# Patient Record
Sex: FEMALE | Race: WHITE | NOT HISPANIC OR LATINO | Employment: FULL TIME
[De-identification: names, ages, dates, MRNs, and addresses within clinical notes are randomized per-mention and may not be internally consistent; named-entity substitution may affect disease eponyms.]

---

## 2017-08-05 ENCOUNTER — HEALTH MAINTENANCE LETTER (OUTPATIENT)
Age: 52
End: 2017-08-05

## 2017-08-09 ENCOUNTER — OFFICE VISIT (OUTPATIENT)
Dept: DERMATOLOGY | Facility: CLINIC | Age: 52
End: 2017-08-09

## 2017-08-09 DIAGNOSIS — L40.0 PSORIASIS VULGARIS: ICD-10-CM

## 2017-08-09 DIAGNOSIS — L40.50 PSORIATIC ARTHRITIS (H): Primary | ICD-10-CM

## 2017-08-09 RX ORDER — TRIAMCINOLONE ACETONIDE 1 MG/G
OINTMENT TOPICAL
Qty: 454 G | Refills: 3 | Status: SHIPPED | OUTPATIENT
Start: 2017-08-09 | End: 2019-02-12

## 2017-08-09 RX ORDER — HYDROCORTISONE 25 MG/G
OINTMENT TOPICAL
Qty: 60 G | Refills: 3 | Status: SHIPPED | OUTPATIENT
Start: 2017-08-09 | End: 2023-10-31

## 2017-08-09 RX ORDER — ALPRAZOLAM 1 MG
TABLET ORAL
COMMUNITY
Start: 2015-01-01 | End: 2019-02-18

## 2017-08-09 RX ORDER — VENLAFAXINE 100 MG/1
TABLET ORAL
COMMUNITY
Start: 2016-10-16 | End: 2017-09-20

## 2017-08-09 RX ORDER — FAMOTIDINE 20 MG
TABLET ORAL
COMMUNITY
Start: 2016-10-16 | End: 2023-11-06

## 2017-08-09 ASSESSMENT — PAIN SCALES - GENERAL: PAINLEVEL: NO PAIN (0)

## 2017-08-09 NOTE — LETTER
"8/9/2017       RE: Gricel Agarwal  [address] [place] [id number]     Dear Colleague,    Thank you for referring your patient, Gricel Agarwal, to the [facility] DERMATOLOGY at [facility]. Please see a copy of my visit note below.    [facility] Dermatology Note      Dermatology Problem List:  1.Psoriasis and probable psoriatic arthritis    CC:   Chief Complaint   Patient presents with     Psoriasis     Gricel is here today for psoriasis. Gricel notes\" My psoriasis flares all the time. but right now its good\"         Encounter Date: Aug 9, 2017    History of Present Illness:  Ms. Gricel Agarwal is a 51 year old female who presents in self referral for psoriasis.  She remembers having flaking in the scalp but was diagnosed with psoriasis 16 years ago.  She did a trial of enbrel in the past but it didn't help.  She otherwise has used topical steroids in the past.  In 2015, she was diagnosed with breast cancer and underwent radiation therapy. She began to flare with skin and now joint disease.  She made the appointment 3 months ago and has had improvement with natural sunlight.  For her scalp, she is using Neutragena Tsal and Tgel shampoos which help.  She is very concerned about her joints.  She has had swelling in her hands and fingers and morning stiffness and pain of her hands, ankles (l>R), knees and lower back.    Past Medical History:   Patient has history of breast cancer s/p radiation.    Social History:  No smoking    Family History:  Negative for psoriasis    Medications:  Current Outpatient Prescriptions   Medication Sig Dispense Refill     fluticasone-salmeterol (ADVAIR DISKUS) 250-50 MCG/DOSE diskus inhaler        ALPRAZolam (XANAX) 1 MG tablet        anastrozole (ARIMIDEX)        venlafaxine (EFFEXOR) 100 MG tablet        Vitamin D, Cholecalciferol, 1000 UNITS CAPS        Albuterol Sulfate (VENTOLIN HFA IN)        triamcinolone (KENALOG) 0.1 % " ointment Use at night to rash on body 454 g 3     hydrocortisone 2.5 % ointment Use daily to rash on face, ears, underarms, or groin 60 g 3     No Known Allergies      Review of Systems:  -As per HPI  -Constitutional: The patient denies fatigue, fevers, chills, unintended weight loss, and night sweats.  -HEENT: Patient denies nonhealing oral sores.  -Skin: As above in HPI. No additional skin concerns.    Physical exam:  Vitals: There were no vitals taken for this visit.  GEN: This is a well developed, well-nourished female in no acute distress, in a pleasant mood.    SKIN: Sun-exposed skin, which includes the head/face, neck, both arms, digits, and/or nails was examined.   -There is an erythematous well demarcated plaque with silvery micaceous scale on the elbows, knees and legs.  Guttate papule of trunk and extremities.  Pitting and onycholysis of fingernails.  Boggy swollen DIP, PIP, and MCP joints of bilateral fingers.  Few crusts of anterior scalp. Mild scale of ears .    -No other lesions of concern on areas examined.     Impression/Plan:  1. Psoriasis and probable Psoriatic arthritis    Discussed cardiac risk factors and psoriatic arthritis- referral to rheumatology. May be complicated with recent breast cancer history    Continue natural sunlight- doing well.  May need to consider NBUVB in fall    Triamcinolone 0.1% ointment to body daily    Hydrocortisone 2.5% ointment to face daily        Follow-up in 3 months, earlier for new or changing lesions.       Staff Involved:  Staff Only    Again, thank you for allowing me to participate in the care of your patient.      Sincerely,    Nicolle Raphael MD

## 2017-08-09 NOTE — NURSING NOTE
"Dermatology Rooming Note    Gricel Agarwal's goals for this visit include:   Chief Complaint   Patient presents with     Psoriasis     Gricel is here today for psoriasis. Gricel notes\" My psoriasis flares all the time. but right now its good\"     Korina Alas MA    "

## 2017-08-09 NOTE — LETTER
Date:August 14, 2017      Patient was self referred, no letter generated. Do not send.        [facility] Health Information

## 2017-08-09 NOTE — PROGRESS NOTES
"[facility] Dermatology Note      Dermatology Problem List:  1.Psoriasis and probable psoriatic arthritis    CC:   Chief Complaint   Patient presents with     Psoriasis     Gricel is here today for psoriasis. Gricel notes\" My psoriasis flares all the time. but right now its good\"         Encounter Date: Aug 9, 2017    History of Present Illness:  Ms. Gricel Agarwal is a 51 year old female who presents in self referral for psoriasis.  She remembers having flaking in the scalp but was diagnosed with psoriasis 16 years ago.  She did a trial of enbrel in the past but it didn't help.  She otherwise has used topical steroids in the past.  In 2015, she was diagnosed with breast cancer and underwent radiation therapy. She began to flare with skin and now joint disease.  She made the appointment 3 months ago and has had improvement with natural sunlight.  For her scalp, she is using Neutragena Tsal and Tgel shampoos which help.  She is very concerned about her joints.  She has had swelling in her hands and fingers and morning stiffness and pain of her hands, ankles (l>R), knees and lower back.    Past Medical History:   Patient has history of breast cancer s/p radiation.    Social History:  No smoking    Family History:  Negative for psoriasis    Medications:  Current Outpatient Prescriptions   Medication Sig Dispense Refill     fluticasone-salmeterol (ADVAIR DISKUS) 250-50 MCG/DOSE diskus inhaler        ALPRAZolam (XANAX) 1 MG tablet        anastrozole (ARIMIDEX)        venlafaxine (EFFEXOR) 100 MG tablet        Vitamin D, Cholecalciferol, 1000 UNITS CAPS        Albuterol Sulfate (VENTOLIN HFA IN)        triamcinolone (KENALOG) 0.1 % ointment Use at night to rash on body 454 g 3     hydrocortisone 2.5 % ointment Use daily to rash on face, ears, underarms, or groin 60 g 3     No Known Allergies      Review of Systems:  -As per HPI  -Constitutional: The patient denies fatigue, fevers, chills, unintended weight " loss, and night sweats.  -HEENT: Patient denies nonhealing oral sores.  -Skin: As above in HPI. No additional skin concerns.    Physical exam:  Vitals: There were no vitals taken for this visit.  GEN: This is a well developed, well-nourished female in no acute distress, in a pleasant mood.    SKIN: Sun-exposed skin, which includes the head/face, neck, both arms, digits, and/or nails was examined.   -There is an erythematous well demarcated plaque with silvery micaceous scale on the elbows, knees and legs.  Guttate papule of trunk and extremities.  Pitting and onycholysis of fingernails.  Boggy swollen DIP, PIP, and MCP joints of bilateral fingers.  Few crusts of anterior scalp. Mild scale of ears .    -No other lesions of concern on areas examined.     Impression/Plan:  1. Psoriasis and probable Psoriatic arthritis    Discussed cardiac risk factors and psoriatic arthritis- referral to rheumatology. May be complicated with recent breast cancer history    Continue natural sunlight- doing well.  May need to consider NBUVB in fall    Triamcinolone 0.1% ointment to body daily    Hydrocortisone 2.5% ointment to face daily        Follow-up in 3 months, earlier for new or changing lesions.       Staff Involved:  Staff Only

## 2017-08-09 NOTE — MR AVS SNAPSHOT
After Visit Summary   8/9/2017    Gricel Agarwal    MRN: [id number]           Patient Information     Date Of Birth          1965        Visit Information        Provider Department      8/9/2017 8:00 AM Nicolle Raphael MD M [facility] Dermatology        Today's Diagnoses     Psoriatic arthritis (H)    -  1    Psoriasis vulgaris           Follow-ups after your visit        Additional Services     RHEUMATOLOGY REFERRAL       Your provider has referred you to: Rheumatology    Please be aware that coverage of these services is subject to the terms and limitations of your health insurance plan.  Call member services at your health plan with any benefit or coverage questions.      Please bring the following with you to your appointment:    (1) Any X-Rays, CTs or MRIs which have been performed.  Contact the facility where they were done to arrange for  prior to your scheduled appointment.    (2) List of current medications   (3) This referral request   (4) Any documents/labs given to you for this referral                  Your next 10 appointments already scheduled     Nov 08, 2017  8:30 AM CST   (Arrive by 8:15 AM)   Return Visit with Nicolle Raphael MD   [facility] Dermatology ([facility])    [address] [place] [id number] [phone]              Who to contact     Please call your clinic at [phone] to:    Ask questions about your health    Make or cancel appointments    Discuss your medicines    Learn about your test results    Speak to your doctor   If you have compliments or concerns about an experience at your clinic, or if you wish to file a complaint, please contact [facility] Patient Relations at [phone] or email us at [email or web address]         Additional Information About Your Visit        MyChart Information     [employer] gives you secure access to your electronic health  record. If you see a primary care provider, you can also send messages to your care team and make appointments. If you have questions, please call your primary care clinic.  If you do not have a primary care provider, please call [phone] and they will assist you.      [employer] is an electronic gateway that provides easy, online access to your medical records. With [employer], you can request a clinic appointment, read your test results, renew a prescription or communicate with your care team.     To access your existing account, please contact your [facility] or call [phone] for assistance.        Care EveryWhere ID     This is your Care EveryWhere ID. This could be used by other organizations to access your [facility] medical records  [id number]         Blood Pressure from Last 3 Encounters:   No data found for BP    Weight from Last 3 Encounters:   No data found for Wt              We Performed the Following     RHEUMATOLOGY REFERRAL          Today's Medication Changes          These changes are accurate as of: 8/9/17  8:27 AM.  If you have any questions, ask your nurse or doctor.               Start taking these medicines.        Dose/Directions    hydrocortisone 2.5 % ointment   Used for:  Psoriasis vulgaris   Started by:  Nicolle Raphael MD        Use daily to rash on face, ears, underarms, or groin   Quantity:  60 g   Refills:  3       triamcinolone 0.1 % ointment   Commonly known as:  KENALOG   Used for:  Psoriasis vulgaris   Started by:  Nicolle Raphael MD        Use at night to rash on body   Quantity:  454 g   Refills:  3            Where to get your medicines      These medications were sent to [facility]/pharmacy #[id number] [address] AT [address] [address]     Phone:  [phone]     hydrocortisone 2.5 % ointment    triamcinolone 0.1 % ointment                Primary Care Provider    None  Specified       No primary provider on file.        Equal Access to Services     BAY GIORDANO : Hadii aad ku hadreidcrystal Neumann, wanikitada robertokarenha, sharonmarc salehiainmedhat fernández. So [facility] [phone].    ATENCIÓN: Si habla español, tiene a luis disposición servicios gratuitos de asistencia lingüística. Llame al [phone].    We comply with applicable federal civil rights laws and [place] laws. We do not discriminate on the basis of race, color, national origin, age, disability sex, sexual orientation or gender identity.            Thank you!     Thank you for choosing [facility] DERMATOLOGY  for your care. Our goal is always to provide you with excellent care. Hearing back from our patients is one way we can continue to improve our services. Please take a few minutes to complete the written survey that you may receive in the mail after your visit with us. Thank you!             Your Updated Medication List - Protect others around you: Learn how to safely use, store and throw away your medicines at www.disposemymeds.org.          This list is accurate as of: 8/9/17  8:27 AM.  Always use your most recent med list.                   Brand Name Dispense Instructions for use Diagnosis    ADVAIR DISKUS 250-50 MCG/DOSE diskus inhaler   Generic drug:  fluticasone-salmeterol           ALPRAZolam 1 MG tablet    XANAX          anastrozole    ARIMIDEX          hydrocortisone 2.5 % ointment     60 g    Use daily to rash on face, ears, underarms, or groin    Psoriasis vulgaris       triamcinolone 0.1 % ointment    KENALOG    454 g    Use at night to rash on body    Psoriasis vulgaris       venlafaxine 100 MG tablet    EFFEXOR          VENTOLIN HFA IN           Vitamin D (Cholecalciferol) 1000 UNITS Caps

## 2017-08-24 ENCOUNTER — PRE VISIT (OUTPATIENT)
Dept: RHEUMATOLOGY | Facility: CLINIC | Age: 52
End: 2017-08-24

## 2017-08-24 NOTE — TELEPHONE ENCOUNTER
1.  Date/reason for appt: 9/20/17- Psoriatic Arthritis  2.  Referring provider: Dr. Raphael  3.  Call to patient (Yes / No - short description): no, internal referral  4.  Previous care at / records requested from: [facility] -- records in epic

## 2017-09-20 ENCOUNTER — OFFICE VISIT (OUTPATIENT)
Dept: RHEUMATOLOGY | Facility: CLINIC | Age: 52
End: 2017-09-20
Attending: NURSE PRACTITIONER
Payer: COMMERCIAL

## 2017-09-20 VITALS
HEIGHT: 69 IN | BODY MASS INDEX: 28.82 KG/M2 | OXYGEN SATURATION: 99 % | DIASTOLIC BLOOD PRESSURE: 88 MMHG | HEART RATE: 76 BPM | WEIGHT: 194.6 LBS | SYSTOLIC BLOOD PRESSURE: 130 MMHG

## 2017-09-20 DIAGNOSIS — M19.90 INFLAMMATORY ARTHRITIS: ICD-10-CM

## 2017-09-20 DIAGNOSIS — L40.50 PSORIATIC ARTHRITIS (H): ICD-10-CM

## 2017-09-20 DIAGNOSIS — M19.90 INFLAMMATORY ARTHRITIS: Primary | ICD-10-CM

## 2017-09-20 LAB
ALBUMIN SERPL-MCNC: 3.8 G/DL (ref 3.4–5)
ALBUMIN UR-MCNC: NEGATIVE MG/DL
ALT SERPL W P-5'-P-CCNC: 34 U/L (ref 0–50)
APPEARANCE UR: ABNORMAL
AST SERPL W P-5'-P-CCNC: 28 U/L (ref 0–45)
BASOPHILS # BLD AUTO: 0 10E9/L (ref 0–0.2)
BASOPHILS NFR BLD AUTO: 0 %
BILIRUB UR QL STRIP: NEGATIVE
COLOR UR AUTO: YELLOW
CREAT SERPL-MCNC: 0.6 MG/DL (ref 0.52–1.04)
CRP SERPL-MCNC: <2.9 MG/L (ref 0–8)
DIFFERENTIAL METHOD BLD: NORMAL
EOSINOPHIL # BLD AUTO: 0 10E9/L (ref 0–0.7)
EOSINOPHIL NFR BLD AUTO: 0 %
ERYTHROCYTE [DISTWIDTH] IN BLOOD BY AUTOMATED COUNT: 12.8 % (ref 10–15)
ERYTHROCYTE [SEDIMENTATION RATE] IN BLOOD BY WESTERGREN METHOD: 6 MM/H (ref 0–30)
GFR SERPL CREATININE-BSD FRML MDRD: >90 ML/MIN/1.7M2
GLUCOSE UR STRIP-MCNC: NEGATIVE MG/DL
HBV CORE AB SERPL QL IA: NONREACTIVE
HBV SURFACE AG SERPL QL IA: NONREACTIVE
HCT VFR BLD AUTO: 39.4 % (ref 35–47)
HCV AB SERPL QL IA: NONREACTIVE
HGB BLD-MCNC: 13.3 G/DL (ref 11.7–15.7)
HGB UR QL STRIP: NEGATIVE
HIV 1+2 AB+HIV1 P24 AG SERPL QL IA: NONREACTIVE
IMM GRANULOCYTES # BLD: 0 10E9/L (ref 0–0.4)
IMM GRANULOCYTES NFR BLD: 0.2 %
KETONES UR STRIP-MCNC: NEGATIVE MG/DL
LEUKOCYTE ESTERASE UR QL STRIP: ABNORMAL
LYMPHOCYTES # BLD AUTO: 0.9 10E9/L (ref 0.8–5.3)
LYMPHOCYTES NFR BLD AUTO: 17.6 %
MCH RBC QN AUTO: 32.3 PG (ref 26.5–33)
MCHC RBC AUTO-ENTMCNC: 33.8 G/DL (ref 31.5–36.5)
MCV RBC AUTO: 96 FL (ref 78–100)
MONOCYTES # BLD AUTO: 0.4 10E9/L (ref 0–1.3)
MONOCYTES NFR BLD AUTO: 7.6 %
MUCOUS THREADS #/AREA URNS LPF: PRESENT /LPF
NEUTROPHILS # BLD AUTO: 3.8 10E9/L (ref 1.6–8.3)
NEUTROPHILS NFR BLD AUTO: 74.6 %
NITRATE UR QL: NEGATIVE
NRBC # BLD AUTO: 0 10*3/UL
NRBC BLD AUTO-RTO: 0 /100
PH UR STRIP: 6 PH (ref 5–7)
PLATELET # BLD AUTO: 204 10E9/L (ref 150–450)
RBC # BLD AUTO: 4.12 10E12/L (ref 3.8–5.2)
RBC #/AREA URNS AUTO: 2 /HPF (ref 0–2)
SOURCE: ABNORMAL
SP GR UR STRIP: 1.02 (ref 1–1.03)
SQUAMOUS #/AREA URNS AUTO: 8 /HPF (ref 0–1)
TSH SERPL DL<=0.005 MIU/L-ACNC: 3.35 MU/L (ref 0.4–4)
UROBILINOGEN UR STRIP-MCNC: 0 MG/DL (ref 0–2)
WBC # BLD AUTO: 5.1 10E9/L (ref 4–11)
WBC #/AREA URNS AUTO: 5 /HPF (ref 0–2)

## 2017-09-20 PROCEDURE — 86140 C-REACTIVE PROTEIN: CPT | Performed by: NURSE PRACTITIONER

## 2017-09-20 PROCEDURE — 86200 CCP ANTIBODY: CPT | Performed by: NURSE PRACTITIONER

## 2017-09-20 PROCEDURE — 86480 TB TEST CELL IMMUN MEASURE: CPT | Performed by: NURSE PRACTITIONER

## 2017-09-20 PROCEDURE — 84450 TRANSFERASE (AST) (SGOT): CPT | Performed by: NURSE PRACTITIONER

## 2017-09-20 PROCEDURE — 99212 OFFICE O/P EST SF 10 MIN: CPT | Mod: ZF

## 2017-09-20 PROCEDURE — 82040 ASSAY OF SERUM ALBUMIN: CPT | Performed by: NURSE PRACTITIONER

## 2017-09-20 PROCEDURE — 82565 ASSAY OF CREATININE: CPT | Performed by: NURSE PRACTITIONER

## 2017-09-20 PROCEDURE — 84443 ASSAY THYROID STIM HORMONE: CPT | Performed by: NURSE PRACTITIONER

## 2017-09-20 PROCEDURE — 84460 ALANINE AMINO (ALT) (SGPT): CPT | Performed by: NURSE PRACTITIONER

## 2017-09-20 PROCEDURE — 36415 COLL VENOUS BLD VENIPUNCTURE: CPT | Performed by: NURSE PRACTITIONER

## 2017-09-20 PROCEDURE — 87389 HIV-1 AG W/HIV-1&-2 AB AG IA: CPT | Performed by: NURSE PRACTITIONER

## 2017-09-20 PROCEDURE — 86039 ANTINUCLEAR ANTIBODIES (ANA): CPT | Performed by: NURSE PRACTITIONER

## 2017-09-20 PROCEDURE — 81001 URINALYSIS AUTO W/SCOPE: CPT | Performed by: NURSE PRACTITIONER

## 2017-09-20 PROCEDURE — 85025 COMPLETE CBC W/AUTO DIFF WBC: CPT | Performed by: NURSE PRACTITIONER

## 2017-09-20 PROCEDURE — 87340 HEPATITIS B SURFACE AG IA: CPT | Performed by: NURSE PRACTITIONER

## 2017-09-20 PROCEDURE — 86803 HEPATITIS C AB TEST: CPT | Performed by: NURSE PRACTITIONER

## 2017-09-20 PROCEDURE — 86431 RHEUMATOID FACTOR QUANT: CPT | Performed by: NURSE PRACTITIONER

## 2017-09-20 PROCEDURE — 85652 RBC SED RATE AUTOMATED: CPT | Performed by: NURSE PRACTITIONER

## 2017-09-20 PROCEDURE — 86038 ANTINUCLEAR ANTIBODIES: CPT | Performed by: NURSE PRACTITIONER

## 2017-09-20 PROCEDURE — 86704 HEP B CORE ANTIBODY TOTAL: CPT | Performed by: NURSE PRACTITIONER

## 2017-09-20 RX ORDER — VENLAFAXINE HYDROCHLORIDE 150 MG/1
300 TABLET, EXTENDED RELEASE ORAL DAILY
Qty: 1 TABLET | Refills: 0 | COMMUNITY
Start: 2017-09-20 | End: 2019-02-18

## 2017-09-20 ASSESSMENT — PAIN SCALES - GENERAL: PAINLEVEL: NO PAIN (0)

## 2017-09-20 NOTE — MR AVS SNAPSHOT
After Visit Summary   9/20/2017    Gricel Agarwal    MRN: [id number]           Patient Information     Date Of Birth          1965        Visit Information        Provider Department      9/20/2017 10:00 AM Lee Ross APRN CNP M [facility] Rheumatology        Today's Diagnoses     Inflammatory arthritis    -  1    Psoriatic arthritis (H)          Care Instructions    Recommend sulfasalazine or methotrexate           Follow-ups after your visit        Follow-up notes from your care team     Return in about 2 weeks (around 10/4/2017) for Labs + X-rays Today.      Your next 10 appointments already scheduled     Sep 20, 2017 11:30 AM CDT   Lab with  LAB   [facility] ([facility])    [address] [id number] [phone]            Sep 20, 2017 11:50 AM CDT   XR FOOT BILATERAL G/E 3 VIEWS with ORTHXR1   [facility] XRay ([facility])    [address] [address] [phone]           Please bring a list of your current medicines to your exam. (Include vitamins, minerals and over-thecounter medicines.) Leave your valuables at home.  Tell your doctor if there is a chance you may be pregnant.  You do not need to do anything special for this exam.            Sep 20, 2017 12:20 PM CDT   XR HAND BILATERAL 1 VIEW with UCXR1   [facility] ([facility])    [address] [id number] [phone]           Please bring a list of your current medicines to your exam. (Include vitamins, minerals and over-thecounter medicines.) Leave your valuables at home.  Tell your doctor if there is a chance you may be pregnant.  You do not need to do anything special for this exam.            Oct 09, 2017  7:30 AM CDT   (Arrive by 7:15 AM)   Return Visit with JENELLE Molina CNP [facility] Rheumatology (  [facility])    [address] [facility]  [address] [facility] [address]   [phone]            Nov 08, 2017  8:30 AM CST   (Arrive by 8:15 AM)   Return Visit with Nicolle Raphael MD   [facility] Dermatology ([facility])    [address] [facility]  [address] [facility] [address]   [phone]              Future tests that were ordered for you today     Open Future Orders        Priority Expected Expires Ordered    CBC with platelets differential Routine 9/20/2017 10/20/2017 9/20/2017    AST Routine 9/20/2017 10/20/2017 9/20/2017    ALT Routine 9/20/2017 10/20/2017 9/20/2017    Albumin level Routine 9/20/2017 10/20/2017 9/20/2017    Creatinine Routine 9/20/2017 10/20/2017 9/20/2017    CRP inflammation Routine 9/20/2017 10/20/2017 9/20/2017    Erythrocyte sedimentation rate auto Routine 9/20/2017 10/20/2017 9/20/2017    Hepatitis B core antibody Routine 9/20/2017 10/20/2017 9/20/2017    Hepatitis B surface antigen Routine 9/20/2017 10/20/2017 9/20/2017    Hepatitis C antibody Routine 9/20/2017 10/20/2017 9/20/2017    M Tuberculosis by Quantiferon Routine 9/20/2017 10/20/2017 9/20/2017    XR Foot Bilateral G/E 3 Views Routine 9/20/2017 10/20/2017 9/20/2017    SORIN by IFA: Laboratory Miscellaneous Order Routine 9/20/2017 10/20/2017 9/20/2017    Cyclic Citrullinated Peptide Antibody IgG Routine 9/20/2017 10/20/2017 9/20/2017    Rheumatoid factor Routine 9/20/2017 10/20/2017 9/20/2017    UA with Microscopic Routine 9/20/2017 10/20/2017 9/20/2017    HIV Antigen Antibody Combo Routine 9/20/2017 10/20/2017 9/20/2017    TSH with free T4 reflex Routine 9/20/2017 10/20/2017 9/20/2017    XR Hand Bilateral 1 View Routine 9/20/2017 9/20/2018 9/20/2017            Who to contact     If you have questions or need follow up information about today's clinic visit or your schedule please contact [facility] RHEUMATOLOGY directly at [phone].  Normal or non-critical lab and  "imaging results will be communicated to you by MyChart, letter or phone within 4 business days after the clinic has received the results. If you do not hear from us within 7 days, please contact the clinic through [employer] or phone. If you have a critical or abnormal lab result, we will notify you by phone as soon as possible.  Submit refill requests through [employer] or call your pharmacy and they will forward the refill request to us. Please allow 3 business days for your refill to be completed.          Additional Information About Your Visit        [employer] Information     [employer] gives you secure access to your electronic health record. If you see a primary care provider, you can also send messages to your care team and make appointments. If you have questions, please call your primary care clinic.  If you do not have a primary care provider, please call [phone] and they will assist you.        Care EveryWhere ID     This is your Care EveryWhere ID. This could be used by other organizations to access your [facility] medical records  [id number]        Your Vitals Were     Pulse Height Pulse Oximetry BMI (Body Mass Index)          76 1.753 m (5' 9\") 99% 28.74 kg/m2         Blood Pressure from Last 3 Encounters:   09/20/17 130/88    Weight from Last 3 Encounters:   09/20/17 88.3 kg (194 lb 9.6 oz)                 Today's Medication Changes          These changes are accurate as of: 9/20/17 11:28 AM.  If you have any questions, ask your nurse or doctor.               These medicines have changed or have updated prescriptions.        Dose/Directions    venlafaxine 150 MG Tb24 24 hr tablet   Commonly known as:  EFFEXOR-ER   This may have changed:  Another medication with the same name was removed. Continue taking this medication, and follow the directions you see here.   Used for:  Psoriatic arthritis (H), Inflammatory arthritis   Changed by:  Lee Ross APRN CNP        Dose:  300 mg   Take 2 tablets (300 " mg) by mouth daily   Quantity:  1 tablet   Refills:  0                Primary Care Provider    None Specified       No primary provider on file.        Equal Access to Services     BAY GIORDANO : Hadii aad ku hadreidcrystal Elviali, rainer robertokarenha, talia malone macklisa, medhat pauliein hayaaelia gironneo menjivar arya serra. So [facility] [phone].    ATENCIÓN: Si habla español, tiene a luis disposición servicios gratuitos de asistencia lingüística. Llame al [phone].    We comply with applicable federal civil rights laws and [place] laws. We do not discriminate on the basis of race, color, national origin, age, disability sex, sexual orientation or gender identity.            Thank you!     Thank you for choosing [facility] RHEUMATOLOGY  for your care. Our goal is always to provide you with excellent care. Hearing back from our patients is one way we can continue to improve our services. Please take a few minutes to complete the written survey that you may receive in the mail after your visit with us. Thank you!             Your Updated Medication List - Protect others around you: Learn how to safely use, store and throw away your medicines at www.disposemymeds.org.          This list is accurate as of: 9/20/17 11:28 AM.  Always use your most recent med list.                   Brand Name Dispense Instructions for use Diagnosis    ADVAIR DISKUS 250-50 MCG/DOSE diskus inhaler   Generic drug:  fluticasone-salmeterol           ALPRAZolam 1 MG tablet    XANAX          anastrozole    ARIMIDEX          hydrocortisone 2.5 % ointment     60 g    Use daily to rash on face, ears, underarms, or groin    Psoriasis vulgaris       triamcinolone 0.1 % ointment    KENALOG    454 g    Use at night to rash on body    Psoriasis vulgaris       venlafaxine 150 MG Tb24 24 hr tablet    EFFEXOR-ER    1 tablet    Take 2 tablets (300 mg) by mouth daily    Psoriatic arthritis (H), Inflammatory arthritis       VENTOLIN HFA IN           Vitamin D  (Cholecalciferol) 1000 UNITS Caps

## 2017-09-20 NOTE — PROGRESS NOTES
"[facility] - Rheumatology Clinic Visit        Gricel Agarwal  is a 51 year old here for consultation of possible psoriatic arthritis, +psoriasis under care of derm (young started, 1994). Enbrel 5 yrs ago 2012 so stopped not help with scalp psoriasis, prior topical steroids. Seen Rheum in 2013-no clear dx     Seen derm who looked at her hands and told her needs to see Rheum. Mornings with stiffness fingers, hard time making a fist, left outside hip, wrists this started 2 yr ago. Progressive the past few years \"getting up is stiff\" then better. This lasts 10 min then improved. C/o pain in always hands and fingers, dull ache ankles L>R, and low back with pain and swelling. Jang noroma in left foot. Stiff feet. +achilles pain intermittent, tendonitis in elbow, left out side hip (this is hard to sleep). Morning \"back is bad\" this has always been bad for me\"-cracks this to stretch it. Not sure if loss ROM. Does stretch. Better when gets up to move her back. Root canal and crown on 2 teeth then got ABX--needs another crown. ABX is resolved. Fingers swelling, always this been this way for a few years. Toes may swell. Migratory swelling. Left ankle laterally swelling. Fatigue     Psoriasis trunk extremities, elbows, knees, legs, scalp, inner ears, face \"plaques before not spots\". +nail changes. On Triamcinolone 0.1% ointment to body daily and Hydrocortisone 2.5% ointment to face daily.  Cream are working some. Skin worse with stress.     L breast CA invasive ductal carcinoma in situ gradwe 2 +invasive tumor ER/[place]+ there was concern DCIS with comedo type necrosis (not in lymph) did radiation in 2016 not needed chemo-[facility] Cancer Dr. Hsu this Nov is 2 years. On aimidex for 10 yr will be seeing oncologist soon     Denies any fever, chills, cough, SOB, CP.     PMH:  Medical-psoriasis, L breast cancer dx 2015 underwent radiation, root canal with crowns-got infection, irritable bowel, " "depression, asthma, DEXA  NL   Surgical-breast cancer surgery lumpectomy then lymph nodes removed 2 weeks later, 2013  No blood transfusions  Injury-left ankle ligaments torn (sports injury 16 yr old)    FH:  No autoimmune disorders, psoriasis, UC, crohn's, SLE, RA, PsA, gout.  No MS in family  Mother-healthy, HTN, thyroid    Father-passed colon cancer  Siblings-1 brother healthy  Children-1 dtr healthy  Father's side \"all  from cancer\" pancreatic PGM; leukemia -PGF      SH:  Rare Alcohol. Never Smoking. No IVDU. 1Children. Right handed. . Works at salon and cleans houses      PMSH personally reviewed and updated by me.    ROS:  Negative raynaud s phenomena, hairloss, sun sensitivities, keratoconjunctivitis sicca, pleurisy, significant rashes like malar, oral/nasal or ulcerations, inflammatory eye disease, inflammatory bowel disease. Negative for miscarriages over 2 months into pregnancy, blood clots, gout, UC, crohn's. No temporal headache, no jaw claudication, no scalp tenderness, vision changes, carotidynia, cough    +night sweats from her meds  +hot flashes   +see above   CONSTITUTIONAL: No fevers, night sweats or unintentional weight change. No acute distress, swollen glands  EYES: No vision change, diplopia, pain in eyes or red eyes   EARS, NOSE, MOUTH, THROAT: No tinnitus or hearing change, no epistaxis or nasal discharge, no oral lesions, throat clear. Normal saliva pool.  No thyroid enlargement  CARDIOVASCULAR: No chest pain, palpitations, or pain with walking, no orthopnea or PND   RESPIRATORY: No dyspnea, cough, shortness of breath or wheezing. No pleurisy.   GI: No nausea, vomiting, diarrhea or constipation, no abdominal pain, or blood in stools.   : No change in urine, no dysuria or hematuria   MUSCKL: See above    INTEGUMENTARY: No concerning lesions or moles   NEURO: No loss of strength or sensation, no numbness or tingling, no tremor, no dizziness, no headache. No falls " "  ENDO: No polyuria or polydipsia, no temperature intolerance   HEME/LYMPH:No concerning bumps, bleeding problems, or swollen lymph nodes. No recent infections, hospitalizations or new illnesses.   ALLERGY: No environmental allergies   PSYCH:No depression or anxiety, no sleep problems.  Otherwise 14 point ROS obtained, reviewed and found negative.     Physical exam:  Vitals: Blood pressure 130/88, pulse 76, height 1.753 m (5' 9\"), weight 88.3 kg (194 lb 9.6 oz), SpO2 99 %.  Wt Readings from Last 4 Encounters:   09/20/17 88.3 kg (194 lb 9.6 oz)     Constitutional: WD-WN-WG cooperative  Eyes: nl EOM, PERRLA, vision, conjunctiva, sclera  ENT: nl external ears, nose, hearing, lips, teeth, gums, throat  Neck: no mass or thyroid enlargement  Resp: lungs clear to auscultation, nl to palpation, nl effort  CV: RRR, no murmurs, rubs or gallops, no edema  GI: no ABD mass or tenderness, no HSM  : not tested  Lymph: no cervical or epitrochlear nodes  MS: S/t MCPs/PIPs, wrists with redness over her joints, dactylitis in her fingers, pitting and oncholysis of all fingernails. Styloid laxity. B/L elbow tendonitis with tender enthesitis and in her elbows. +bunions. +ITB and trochanteric bursa with enthesitis, +tender in achilles insertion site and plantar fasciitis. Left lateral ankle S/T. B/l bicept enthisitis. +SI tender. All other MJ, neck, shoulder, elbow, wrist, hand, spine, hip, knee, ankle, and foot joints were examined and otherwise found normal. Normal  strength. No deformity. Full ROM. Normal prayer sign. Negative Negative Lhermitte's sign. No periuncle erythema  Skin: no alopecia. +psoriasis elbows, knees, legs, trunk and extremities   Neuro: nl cranial nerves, strength, sensation, DTRs.   Psych: nl judgement, orientation, memory, affect.      Labs/Imaging:  No results found for this or any previous visit.    Impression/Plan:    1. Psoriatic arthritis  2. Psoriasis  3. Breast cancer, under care of oncology    She " has active psoriasis, tendonitis, synovitis, EAS , nail pitting. Meets the diagnosis of psoriatic arthritis. Active psoriatic arthritis, but will check rheumatoid arthritis labs and also x-rays to evaluate for erosive changes. Discussed options SSZ vs MTX. Avoid anti-TNF given her breast cancer hx. Will do labs and x-rays today (evaluate for erosive changes). I will also check her TSH given her fatigue. She will discuss these options and findings with her oncologist.      RTC 2 weeks to discuss results and options      The patient understood the rational for the diagnosis and treatment plan. Patient shared in the decision making. All questions were answered to best of my ability and the patient's satisfaction  Lee ALMANZAR, CNP, MSN  [facility]  Department of Rheumatology & Autoimmune Disorders

## 2017-09-20 NOTE — NURSING NOTE
"Chief Complaint   Patient presents with     Consult For     Referral for Psoriatic Arthritis.       Initial /88  Pulse 76  Ht 1.753 m (5' 9\")  Wt 88.3 kg (194 lb 9.6 oz)  SpO2 99%  BMI 28.74 kg/m2 Estimated body mass index is 28.74 kg/(m^2) as calculated from the following:    Height as of this encounter: 1.753 m (5' 9\").    Weight as of this encounter: 88.3 kg (194 lb 9.6 oz).  Medication Reconciliation: complete   Marysol Thomas., CMA    "

## 2017-09-20 NOTE — LETTER
"9/20/2017      RE: Gricel Agarwal  [address] [place] [address]  [facility] [id number]       [facility] - Rheumatology Clinic Visit        Gricel Agarwal  is a 51 year old here for consultation of possible psoriatic arthritis, +psoriasis under care of derm (young started, 1994). Enbrel 5 yrs ago 2012 so stopped not help with scalp psoriasis, prior topical steroids. Seen Rheum in 2013-no clear dx     Seen derm who looked at her hands and told her needs to see Rheum. Mornings with stiffness fingers, hard time making a fist, left outside hip, wrists this started 2 yr ago. Progressive the past few years \"getting up is stiff\" then better. This lasts 10 min then improved. C/o pain in always hands and fingers, dull ache ankles L>R, and low back with pain and swelling. Jang noroma in left foot. Stiff feet. +achilles pain intermittent, tendonitis in elbow, left out side hip (this is hard to sleep). Morning \"back is bad\" this has always been bad for me\"-cracks this to stretch it. Not sure if loss ROM. Does stretch. Better when gets up to move her back. Root canal and crown on 2 teeth then got ABX--needs another crown. ABX is resolved. Fingers swelling, always this been this way for a few years. Toes may swell. Migratory swelling. Left ankle laterally swelling. Fatigue     Psoriasis trunk extremities, elbows, knees, legs, scalp, inner ears, face \"plaques before not spots\". +nail changes. On Triamcinolone 0.1% ointment to body daily and Hydrocortisone 2.5% ointment to face daily.  Cream are working some. Skin worse with stress.     L breast CA invasive ductal carcinoma in situ gradwe 2 +invasive tumor ER/[place]+ there was concern DCIS with comedo type necrosis (not in lymph) did radiation in 2016 not needed chemo-[facility] Cancer Dr. Hsu this Nov is 2 years. On aimidex for 10 yr will be seeing oncologist soon     Denies any fever, chills, cough, SOB, CP.     PMH:  Medical-psoriasis, L breast cancer dx " "2015 underwent radiation, root canal with crowns-got infection, irritable bowel, depression, asthma, DEXA  NL   Surgical-breast cancer surgery lumpectomy then lymph nodes removed 2 weeks later, [facility]   No blood transfusions  Injury-left ankle ligaments torn (sports injury 16 yr old)    FH:  No autoimmune disorders, psoriasis, UC, crohn's, SLE, RA, PsA, gout.  No MS in family  Mother-healthy, HTN, thyroid    Father-passed colon cancer  Siblings-1 brother healthy  Children-1 dtr healthy  Father's side \"all  from cancer\" pancreatic PGM; leukemia -PGF      SH:  Rare Alcohol. Never Smoking. No IVDU. 1Children. Right handed. . Works at salon and cleans houses      PMSH personally reviewed and updated by me.    ROS:  Negative raynaud s phenomena, hairloss, sun sensitivities, keratoconjunctivitis sicca, pleurisy, significant rashes like malar, oral/nasal or ulcerations, inflammatory eye disease, inflammatory bowel disease. Negative for miscarriages over 2 months into pregnancy, blood clots, gout, UC, crohn's. No temporal headache, no jaw claudication, no scalp tenderness, vision changes, carotidynia, cough    +night sweats from her meds  +hot flashes   +see above   CONSTITUTIONAL: No fevers, night sweats or unintentional weight change. No acute distress, swollen glands  EYES: No vision change, diplopia, pain in eyes or red eyes   EARS, NOSE, MOUTH, THROAT: No tinnitus or hearing change, no epistaxis or nasal discharge, no oral lesions, throat clear. Normal saliva pool.  No thyroid enlargement  CARDIOVASCULAR: No chest pain, palpitations, or pain with walking, no orthopnea or PND   RESPIRATORY: No dyspnea, cough, shortness of breath or wheezing. No pleurisy.   GI: No nausea, vomiting, diarrhea or constipation, no abdominal pain, or blood in stools.   : No change in urine, no dysuria or hematuria   MUSCKL: See above    INTEGUMENTARY: No concerning lesions or moles   NEURO: No loss of strength or " "sensation, no numbness or tingling, no tremor, no dizziness, no headache. No falls   ENDO: No polyuria or polydipsia, no temperature intolerance   HEME/LYMPH:No concerning bumps, bleeding problems, or swollen lymph nodes. No recent infections, hospitalizations or new illnesses.   ALLERGY: No environmental allergies   PSYCH:No depression or anxiety, no sleep problems.  Otherwise 14 point ROS obtained, reviewed and found negative.     Physical exam:  Vitals: Blood pressure 130/88, pulse 76, height 1.753 m (5' 9\"), weight 88.3 kg (194 lb 9.6 oz), SpO2 99 %.  Wt Readings from Last 4 Encounters:   09/20/17 88.3 kg (194 lb 9.6 oz)     Constitutional: WD-WN-WG cooperative  Eyes: nl EOM, PERRLA, vision, conjunctiva, sclera  ENT: nl external ears, nose, hearing, lips, teeth, gums, throat  Neck: no mass or thyroid enlargement  Resp: lungs clear to auscultation, nl to palpation, nl effort  CV: RRR, no murmurs, rubs or gallops, no edema  GI: no ABD mass or tenderness, no HSM  : not tested  Lymph: no cervical or epitrochlear nodes  MS: S/t MCPs/PIPs, wrists with redness over her joints, dactylitis in her fingers, pitting and oncholysis of all fingernails. Styloid laxity. B/L elbow tendonitis with tender enthesitis and in her elbows. +bunions. +ITB and trochanteric bursa with enthesitis, +tender in achilles insertion site and plantar fasciitis. Left lateral ankle S/T. B/l bicept enthisitis. +SI tender. All other MJ, neck, shoulder, elbow, wrist, hand, spine, hip, knee, ankle, and foot joints were examined and otherwise found normal. Normal  strength. No deformity. Full ROM. Normal prayer sign. Negative Negative Lhermitte's sign. No periuncle erythema  Skin: no alopecia. +psoriasis elbows, knees, legs, trunk and extremities   Neuro: nl cranial nerves, strength, sensation, DTRs.   Psych: nl judgement, orientation, memory, affect.      Labs/Imaging:  No results found for this or any previous visit.    Impression/Plan:    1. " Psoriatic arthritis  2. Psoriasis  3. Breast cancer, under care of oncology    She has active psoriasis, tendonitis, synovitis, EAS , nail pitting. Meets the diagnosis of psoriatic arthritis. Active psoriatic arthritis, but will check rheumatoid arthritis labs and also x-rays to evaluate for erosive changes. Discussed options SSZ vs MTX. Avoid anti-TNF given her breast cancer hx. Will do labs and x-rays today (evaluate for erosive changes). I will also check her TSH given her fatigue. She will discuss these options and findings with her oncologist.      RTC 2 weeks to discuss results and options      The patient understood the rational for the diagnosis and treatment plan. Patient shared in the decision making. All questions were answered to best of my ability and the patient's satisfaction  Lee ALMANZAR, CNP, MSN  [facility]  Department of Rheumatology & Autoimmune Disorders

## 2017-09-21 LAB
CCP AB SER IA-ACNC: 1 U/ML
RHEUMATOID FACT SER NEPH-ACNC: <20 IU/ML (ref 0–20)

## 2017-09-22 LAB
ANA PAT SER IF-IMP: ABNORMAL
ANA SER QL IF: POSITIVE
ANA TITR SER IF: ABNORMAL {TITER}
M TB TUBERC IFN-G BLD QL: NEGATIVE
M TB TUBERC IFN-G/MITOGEN IGNF BLD: 0 IU/ML

## 2017-09-27 NOTE — PROGRESS NOTES
AN is non specific and could we seen in setting of malignancy, psoriasis.  -CCP and MTB QG -RF -hepatitis -HIV. NL TSH, CBC, liver and kidney tests. No erosions on x-rays

## 2017-10-04 ENCOUNTER — TRANSFERRED RECORDS (OUTPATIENT)
Dept: HEALTH INFORMATION MANAGEMENT | Facility: CLINIC | Age: 52
End: 2017-10-04

## 2017-11-08 ENCOUNTER — OFFICE VISIT (OUTPATIENT)
Dept: DERMATOLOGY | Facility: CLINIC | Age: 52
End: 2017-11-08

## 2017-11-08 DIAGNOSIS — L40.50 PSORIATIC ARTHRITIS (H): ICD-10-CM

## 2017-11-08 DIAGNOSIS — L40.9 PSORIASIS: Primary | ICD-10-CM

## 2017-11-08 RX ORDER — METHOTREXATE 2.5 MG/1
5 TABLET ORAL ONCE
Qty: 2 TABLET | Refills: 0 | Status: SHIPPED | OUTPATIENT
Start: 2017-11-08 | End: 2019-02-18 | Stop reason: SINTOL

## 2017-11-08 ASSESSMENT — PAIN SCALES - GENERAL: PAINLEVEL: NO PAIN (0)

## 2017-11-08 NOTE — LETTER
11/8/2017       RE: Gricel Agarwal  [facility] [facility] [address]     Dear Colleague,    Thank you for referring your patient, Gricel Agarwal, to the [facility]ERMATOLOGY at [facility]. Please see a copy of my visit note below.    Progress Notes   Nicolle Raphael MD (Physician)     Dermatology   Expand All Collapse All    [facility] Dermatology Note        Dermatology Problem List:  1.Psoriasis and psoriatic arthritis.  Evaluated by Rheum. Saw her Oncology MD who only felt comfortable with MTX due to breast cancer history.  Test dose methotrexate 5 mg once with labs in 1 week and then aim for methotrexate 15 mg weekly with labs     CC:        Chief Complaint   Patient presents with     Psoriasis                  Encounter Date:  Nov 8. 2017     History of Present Illness:  Ms. Gricel Agarwal is a 51 year old female who returns for psoriasis.  She remembers having flaking in the scalp but was diagnosed with psoriasis 16 years ago.  She did a trial of enbrel in the past but it didn't help.  She otherwise has used topical steroids in the past.  In 2015, she was diagnosed with breast cancer and underwent radiation therapy. She began to flare with skin and now joint disease.  She  was started on topical treatment last visit and referred to Rheum.  Rheum felt she has psoriatic arthritis but with her cancer history they wanted her to see oncology before starting any systemic medication.  She saw her oncology doctor and the medication the doctor felt comfortable with was methotrexate.  The patient has worsened with her skin disease since last visit and would like to start the methotrexate.     Past Medical History:   Patient has history of breast cancer s/p radiation.     Social History:  No smoking     Family History:  Negative for psoriasis                  No Known Allergies        Review of Systems:  -As per HPI    -Skin: As above in HPI. No  additional skin concerns.     Physical exam:  Vitals: See nursing note  GEN: This is a well developed, well-nourished female in no acute distress, in a pleasant mood.    SKIN: Sun-exposed skin, which includes the head/face, neck, both arms, digits, and/or nails was examined.   -There is an erythematous well demarcated plaque with silvery micaceous scale on the elbows, knees and legs.     -No other lesions of concern on areas examined.      Impression/Plan:  1. Psoriasis and Psoriatic arthritis    Baseline labs by Rheum for Methotrexate are normal and reviewed.  Discussed side effects of methotrexate including blood, liver, GI, renal, pregnancy and immunosuppression side effects.    Test dose methotrexate 5 mg once with repeat CBC and hepatic panel one week later then if tolerated increase to 15 mg once weekly with weekly safety labs    Triamcinolone 0.1% ointment to body daily    Hydrocortisone 2.5% ointment to face daily           Follow-up in 6 weeks, earlier for new or changing lesions.         Staff Involved:  Staff Only          Again, thank you for allowing me to participate in the care of your patient.      Sincerely,    Nicolle Raphael MD

## 2017-11-08 NOTE — PROGRESS NOTES
Progress Notes   Nicolle Raphael MD (Physician)     Dermatology   Expand All Collapse All    [facility] Dermatology Note        Dermatology Problem List:  1.Psoriasis and psoriatic arthritis.  Evaluated by Rheum. Saw her Oncology MD who only felt comfortable with MTX due to breast cancer history.  Test dose methotrexate 5 mg once with labs in 1 week and then aim for methotrexate 15 mg weekly with labs     CC:        Chief Complaint   Patient presents with     Psoriasis                  Encounter Date:  Nov 8. 2017     History of Present Illness:  Ms. Gricel Agarwal is a 51 year old female who returns for psoriasis.  She remembers having flaking in the scalp but was diagnosed with psoriasis 16 years ago.  She did a trial of enbrel in the past but it didn't help.  She otherwise has used topical steroids in the past.  In 2015, she was diagnosed with breast cancer and underwent radiation therapy. She began to flare with skin and now joint disease.  She was started on topical treatment last visit and referred to Rheum.  Rheum felt she has psoriatic arthritis but with her cancer history they wanted her to see oncology before starting any systemic medication.  She saw her oncology doctor and the medication the doctor felt comfortable with was methotrexate.  The patient has worsened with her skin disease since last visit and would like to start the methotrexate.     Past Medical History:   Patient has history of breast cancer s/p radiation.     Social History:  No smoking     Family History:  Negative for psoriasis                  No Known Allergies        Review of Systems:  -As per HPI    -Skin: As above in HPI. No additional skin concerns.     Physical exam:  Vitals: See nursing note  GEN: This is a well developed, well-nourished female in no acute distress, in a pleasant mood.    SKIN: Sun-exposed skin, which includes the head/face, neck, both arms, digits, and/or nails was examined.   -There  is an erythematous well demarcated plaque with silvery micaceous scale on the elbows, knees and legs.     -No other lesions of concern on areas examined.      Impression/Plan:  1. Psoriasis and Psoriatic arthritis    Baseline labs by Rheum for Methotrexate are normal and reviewed.  Discussed side effects of methotrexate including blood, liver, GI, renal, pregnancy and immunosuppression side effects.    Test dose methotrexate 5 mg once with repeat CBC and hepatic panel one week later then if tolerated increase to 15 mg once weekly with weekly safety labs    Triamcinolone 0.1% ointment to body daily    Hydrocortisone 2.5% ointment to face daily           Follow-up in 6 weeks, earlier for new or changing lesions.         Staff Involved:  Staff Only

## 2017-11-08 NOTE — NURSING NOTE
Dermatology Rooming Note    Gricel Agarwal's goals for this visit include:   Chief Complaint   Patient presents with     Derm Problem     Gricel is here for a psoriasis follow up.  States it's gotten worse since her last visit.            Toshia Swann LPN

## 2017-11-08 NOTE — MR AVS SNAPSHOT
After Visit Summary   11/8/2017    Gricel Agarwal    MRN: [id number]           Patient Information     Date Of Birth          1965        Visit Information        Provider Department      11/8/2017 8:30 AM Nicolle Raphael MD [facility]        Today's Diagnoses     Psoriasis    -  1    Psoriatic arthritis (H)           Follow-ups after your visit        Your next 10 appointments already scheduled     Nov 20, 2017  9:30 AM CST   LAB with  LAB   [facility] ([facility])    [address] [place] [id number] [phone]           Please do not eat 10-12 hours before your appointment if you are coming in fasting for labs on lipids, cholesterol, or glucose (sugar). This does not apply to pregnant women. Water, hot tea and black coffee (with nothing added) are okay. Do not drink other fluids, diet soda or chew gum.            Dec 18, 2017 10:00 AM CST   (Arrive by 9:45 AM)   Return Visit with Nicolle Raphael MD   [facility] ([facility])    [address] [place] [id number] [phone]              Future tests that were ordered for you today     Open Standing Orders        Priority Remaining Interval Expires Ordered    CBC with platelets differential Routine 5/5 weekly 12/28/2017 11/8/2017    Hepatic panel Routine 5/5 weekly 12/28/2017 11/8/2017            Who to contact     Please call your clinic at [phone] to:    Ask questions about your health    Make or cancel appointments    Discuss your medicines    Learn about your test results    Speak to your doctor   If you have compliments or concerns about an experience at your clinic, or if you wish to file a complaint, please contact [facility] Patient Relations at [phone] or email us at [email or web address]         Additional Information About Your Visit        Serenehart  Information     [employer] gives you secure access to your electronic health record. If you see a primary care provider, you can also send messages to your care team and make appointments. If you have questions, please call your primary care clinic.  If you do not have a primary care provider, please call [phone] and they will assist you.      [employer] is an electronic gateway that provides easy, online access to your medical records. With [employer], you can request a clinic appointment, read your test results, renew a prescription or communicate with your care team.     To access your existing account, please contact your [facility] or call [phone] for assistance.        Care EveryWhere ID     This is your Care EveryWhere ID. This could be used by other organizations to access your [facility] medical records  [id number]         Blood Pressure from Last 3 Encounters:   09/20/17 130/88    Weight from Last 3 Encounters:   09/20/17 88.3 kg (194 lb 9.6 oz)                 Today's Medication Changes          These changes are accurate as of: 11/8/17  9:01 AM.  If you have any questions, ask your nurse or doctor.               Start taking these medicines.        Dose/Directions    methotrexate sodium 2.5 MG Tabs   Used for:  Psoriasis, Psoriatic arthritis (H)   Started by:  Nicolle Raphael MD        Dose:  5 mg   Take 5 mg by mouth once for 1 dose   Quantity:  2 tablet   Refills:  0            Where to get your medicines      These medications were sent to [facility]/pharmacy [address] AT [address] [address]     Phone:  [phone]     methotrexate sodium 2.5 MG Tabs                Primary Care Provider    None Specified       No primary provider on file.        Equal Access to Services     BAY GIORDANO AH: rainer Naranjo, medhat rasmussen  tseringkrystinyumiko laresaaelia ah. So [facility] [phone].    ATENCIÓN: Si nadege bach, tiene a luis disposición servicios gratuitos de asistencia lingüística. Carly al [phone].    We comply with applicable federal civil rights laws and [place] laws. We do not discriminate on the basis of race, color, national origin, age, disability, sex, sexual orientation, or gender identity.            Thank you!     Thank you for choosing [facility] DER[facility]  for your care. Our goal is always to provide you with excellent care. Hearing back from our patients is one way we can continue to improve our services. Please take a few minutes to complete the written survey that you may receive in the mail after your visit with us. Thank you!             Your Updated Medication List - Protect others around you: Learn how to safely use, store and throw away your medicines at www.disposemymeds.org.          This list is accurate as of: 11/8/17  9:01 AM.  Always use your most recent med list.                   Brand Name Dispense Instructions for use Diagnosis    ADVAIR DISKUS 250-50 MCG/DOSE diskus inhaler   Generic drug:  fluticasone-salmeterol           ALPRAZolam 1 MG tablet    XANAX          anastrozole    ARIMIDEX          hydrocortisone 2.5 % ointment     60 g    Use daily to rash on face, ears, underarms, or groin    Psoriasis vulgaris       methotrexate sodium 2.5 MG Tabs     2 tablet    Take 5 mg by mouth once for 1 dose    Psoriasis, Psoriatic arthritis (H)       triamcinolone 0.1 % ointment    KENALOG    454 g    Use at night to rash on body    Psoriasis vulgaris       venlafaxine 150 MG Tb24 24 hr tablet    EFFEXOR-ER    1 tablet    Take 2 tablets (300 mg) by mouth daily    Psoriatic arthritis (H), Inflammatory arthritis       VENTOLIN HFA IN           Vitamin D (Cholecalciferol) 1000 UNITS Caps

## 2017-11-08 NOTE — LETTER
Date:November 9, 2017      Patient was self referred, no letter generated. Do not send.        [facility] Health Information

## 2017-11-20 DIAGNOSIS — L40.50 PSORIATIC ARTHRITIS (H): ICD-10-CM

## 2017-11-20 DIAGNOSIS — L40.9 PSORIASIS: ICD-10-CM

## 2017-11-20 LAB
ALBUMIN SERPL-MCNC: 3.3 G/DL (ref 3.4–5)
ALP SERPL-CCNC: 126 U/L (ref 40–150)
ALT SERPL W P-5'-P-CCNC: 33 U/L (ref 0–50)
AST SERPL W P-5'-P-CCNC: 21 U/L (ref 0–45)
BASOPHILS # BLD AUTO: 0 10E9/L (ref 0–0.2)
BASOPHILS NFR BLD AUTO: 0 %
BILIRUB DIRECT SERPL-MCNC: <0.1 MG/DL (ref 0–0.2)
BILIRUB SERPL-MCNC: 0.4 MG/DL (ref 0.2–1.3)
DIFFERENTIAL METHOD BLD: ABNORMAL
EOSINOPHIL # BLD AUTO: 0 10E9/L (ref 0–0.7)
EOSINOPHIL NFR BLD AUTO: 0 %
ERYTHROCYTE [DISTWIDTH] IN BLOOD BY AUTOMATED COUNT: 13.2 % (ref 10–15)
HCT VFR BLD AUTO: 39.7 % (ref 35–47)
HGB BLD-MCNC: 13.3 G/DL (ref 11.7–15.7)
IMM GRANULOCYTES # BLD: 0 10E9/L (ref 0–0.4)
IMM GRANULOCYTES NFR BLD: 0 %
LYMPHOCYTES # BLD AUTO: 1.2 10E9/L (ref 0.8–5.3)
LYMPHOCYTES NFR BLD AUTO: 32.6 %
MCH RBC QN AUTO: 32.6 PG (ref 26.5–33)
MCHC RBC AUTO-ENTMCNC: 33.5 G/DL (ref 31.5–36.5)
MCV RBC AUTO: 97 FL (ref 78–100)
MONOCYTES # BLD AUTO: 0.3 10E9/L (ref 0–1.3)
MONOCYTES NFR BLD AUTO: 8.4 %
NEUTROPHILS # BLD AUTO: 2.2 10E9/L (ref 1.6–8.3)
NEUTROPHILS NFR BLD AUTO: 59 %
NRBC # BLD AUTO: 0 10*3/UL
NRBC BLD AUTO-RTO: 0 /100
PLATELET # BLD AUTO: 195 10E9/L (ref 150–450)
PROT SERPL-MCNC: 6.8 G/DL (ref 6.8–8.8)
RBC # BLD AUTO: 4.08 10E12/L (ref 3.8–5.2)
WBC # BLD AUTO: 3.7 10E9/L (ref 4–11)

## 2017-12-21 ENCOUNTER — TELEPHONE (OUTPATIENT)
Dept: DERMATOLOGY | Facility: CLINIC | Age: 52
End: 2017-12-21

## 2017-12-21 NOTE — TELEPHONE ENCOUNTER
----- Message from Nicolle Raphael MD sent at 11/30/2017  8:37 AM CST -----  Toshia, please call the patient and make sure she gets an appt with Rheum so they can see her.  ----- Message -----     From: Lee Ross APRN CNP     Sent: 11/29/2017   4:15 PM       To: Nicolle Raphael MD    Yes, that would be best. She was to schedule.     Lee ALMANZAR, FABIAN, MSN  [facility]  Department of Rheumatology & Autoimmune Disorders  [facility]: [phone] Opt 2, then Opt 1 Scheduling   [facility]: [phone] Option 7 scheduling    ----- Message -----     From: Nicolle Raphael MD     Sent: 11/20/2017  10:51 AM       To: JENELLE Molina CNP    Hi, we share this patient.  Her psoriatic arthritis is becoming more painful.  I tried to start her methotrexate which was the only medication approved by her oncologist but her WBC dropped with just a test dose of methotrexate 5 mg.  Derm would not go on with methotrexate at that point.  Do you want to see her back to consider any other options?

## 2017-12-21 NOTE — TELEPHONE ENCOUNTER
Sent a my chart message on 12/6 and have tried calling patient 3 seperate times, and have left voicemails with Dr. Raphael's message and also left the Rheumatology appointment number in the message.

## 2018-01-03 NOTE — TELEPHONE ENCOUNTER
Left message with Terrance's scheduling information. Said I would send a letter in the mail too.

## 2019-01-29 ENCOUNTER — DOCUMENTATION ONLY (OUTPATIENT)
Dept: CARE COORDINATION | Facility: CLINIC | Age: 54
End: 2019-01-29

## 2019-02-12 ENCOUNTER — OFFICE VISIT (OUTPATIENT)
Dept: DERMATOLOGY | Facility: CLINIC | Age: 54
End: 2019-02-12
Payer: COMMERCIAL

## 2019-02-12 VITALS — DIASTOLIC BLOOD PRESSURE: 87 MMHG | HEART RATE: 71 BPM | SYSTOLIC BLOOD PRESSURE: 130 MMHG | OXYGEN SATURATION: 96 %

## 2019-02-12 DIAGNOSIS — L40.50 PSORIATIC ARTHRITIS (H): Primary | ICD-10-CM

## 2019-02-12 DIAGNOSIS — L40.0 PSORIASIS VULGARIS: ICD-10-CM

## 2019-02-12 RX ORDER — TRIAMCINOLONE ACETONIDE 1 MG/G
OINTMENT TOPICAL
Qty: 454 G | Refills: 3 | Status: SHIPPED | OUTPATIENT
Start: 2019-02-12 | End: 2023-10-31

## 2019-02-12 ASSESSMENT — PAIN SCALES - GENERAL: PAINLEVEL: MILD PAIN (3)

## 2019-02-12 NOTE — LETTER
Date:February 13, 2019      Patient was self referred, no letter generated. Do not send.        [facility] Health Information

## 2019-02-12 NOTE — PROGRESS NOTES
[facility] Dermatology Note      Dermatology Problem List:    1.Psoriasis and psoriatic arthritis.    --Evaluated by Rheum. Saw her Oncology MD who only felt comfortable with MTX due to breast cancer history. --Test dose methotrexate 5 mg once, but had a drop in WBC, so it was stopped  --Enbrel used back in 2012  --Will have the pt F/U with Lee Ross (rheumatology)    Encounter Date: Feb 12, 2019    CC:   Follow up for psoriasis    History of Present Illness:  Ms. Gricel Agarwal is a 53 year old female who presents as a follow-up for psoriasis and psoriatic arthritis. The patient was last seen September 2018 when she was given a test dose of MTX, but subsequently had leukopenia.    Today, the pt states this is the best her skin psoriasis has been in a while, although her joints are worse. The pt notes her skin psoriasis is currently active only on her lower legs and elbows. She did have scalp involvement in the past, but since starting an  shampoo (coal tar) from [employer], her scalp has cleared. The pt did use a tanning bed Qweek for a few weeks to get things under better control,but she has since stopped. The pt notes her worst joints are her left 1st and 2nd finger, sacrum, and left elbow, left shoulder, hip, and knee. She notes her fingers are sore in the morning, and it takes 10 minutes for them to become less stiff, but they are still stiff for a while longer. She has difficulty lifting her arm above her shoulder.    The pt continues to follow with her oncologist every 3 months. She was also recently diagnosed with osteopenia. Otherwise, the pt states she feels well, and she denies any other general or skin-specific complaints at this time.     Past Medical History:   There is no problem list on file for this patient.    No past medical history on file.  No past surgical history on file.    Social History:  Patient reports that  has never smoked. she has never used smokeless  tobacco.    Family History:  Family History   Problem Relation Age of Onset     Melanoma No family hx of      Skin Cancer No family hx of        Medications:  Current Outpatient Medications   Medication Sig Dispense Refill     Albuterol Sulfate (VENTOLIN HFA IN)        ALPRAZolam (XANAX) 1 MG tablet        anastrozole (ARIMIDEX)        fluticasone-salmeterol (ADVAIR DISKUS) 250-50 MCG/DOSE diskus inhaler        hydrocortisone 2.5 % ointment Use daily to rash on face, ears, underarms, or groin 60 g 3     methotrexate sodium 2.5 MG TABS Take 5 mg by mouth once for 1 dose 2 tablet 0     triamcinolone (KENALOG) 0.1 % ointment Use at night to rash on body 454 g 3     venlafaxine (EFFEXOR-ER) 150 MG TB24 24 hr tablet Take 2 tablets (300 mg) by mouth daily 1 tablet 0     Vitamin D, Cholecalciferol, 1000 UNITS CAPS           No Known Allergies      Review of Systems:  -As per HPI  -Constitutional: Otherwise feeling well today, in usual state of health.  -HEENT: Patient denies nonhealing oral sores.  -Skin: As above in HPI. No additional skin concerns.    Physical exam:  Vitals: There were no vitals taken for this visit.  GEN: This is a well developed, well-nourished female in no acute distress, in a pleasant mood.    SKIN: Focused examination of the face, scalp, neck, back, right and left arms, hands, finger, finger nails, legs, and feet was performed.  -Finger nails covered with nail polish. Onycholysis and oil spots of the toe nails  -Scattered pink papules and plaques with minimal scale on the bilateral lower legs and the elbows. No scaling of the scalp  -Dactylitis of the left first and second digit. None of the joints are painful on palpation  -No other lesions of concern on areas examined.     Impression/Plan:    1. Psoriasis and psoriatic arthritis    At this time, pt's cutaneous dz is at a stable place for the pt, however, her joint pain is active and start to limit her daily activities. Given hx of breast cancer,  we are limited to several medications. Unfortunately, the pt did not tolerate MTX. Apremilast is an option. Although the pt does have depression, it is currently well controlled. Hydroxyurea and sulfasalazine could also be options.    Due to ongoing and progressive joint pain and our inability to closely monitor progression, we have referred the pt back to rheumatology for evaluation, and ultimately, the decision on starting a systemic therapy for her psoriatic arthritis    I have refilled the pt's TAC today      CC Referred Self, MD  No address on file on close of this encounter.  Follow-up in 3 months, earlier for new or changing lesions.       Dr. Raphael staffed the patient.    Staff Involved:  Resident(Davida)/Staff  .I, Nicolle Raphael MD, saw this patient with the resident and agree with the resident s findings and plan of care as documented in the resident s note.

## 2019-02-12 NOTE — LETTER
2/12/2019       RE: Gricel Agarwal  [address] [place] [id number]     Dear Colleague,    Thank you for referring your patient, Gricel Agarwal, to the [facility] DERMATOLOG[facility] at [facility]. Please see a copy of my visit note below.    [facility] Dermatology Note      Dermatology Problem List:    1.Psoriasis and psoriatic arthritis.    --Evaluated by Rheum. Saw her Oncology MD who only felt comfortable with MTX due to breast cancer history. --Test dose methotrexate 5 mg once, but had a drop in WBC, so it was stopped  --Enbrel used back in 2012  --Will have the pt F/U with Lee Ross (rheumatology)    Encounter Date: Feb 12, 2019    CC:   Follow up for psoriasis    History of Present Illness:  Ms. Gricel Agarwal is a 53 year old female who presents as a follow-up for psoriasis and psoriatic arthritis. The patient was last seen September 2018 when she was given a test dose of MTX, but subsequently had leukopenia.    Today, the pt states this is the best her skin psoriasis has been in a while, although her joints are worse. The pt notes her skin psoriasis is currently active only on her lower legs and elbows. She did have scalp involvement in the past, but since starting an  shampoo (coal tar) from [employer], her scalp has cleared. The pt did use a tanning bed Qweek for a few weeks to get things under better control,but she has since stopped. The pt notes her worst joints are her left 1st and 2nd finger, sacrum, and left elbow, left shoulder, hip, and knee. She notes her fingers are sore in the morning, and it takes 10 minutes for them to become less stiff, but they are still stiff for a while longer. She has difficulty lifting her arm above her shoulder.    The pt continues to follow with her oncologist every 3 months. She was also recently diagnosed with osteopenia. Otherwise, the pt states she feels well, and she denies any other general or  skin-specific complaints at this time.     Past Medical History:   There is no problem list on file for this patient.    No past medical history on file.  No past surgical history on file.    Social History:  Patient reports that  has never smoked. she has never used smokeless tobacco.    Family History:  Family History   Problem Relation Age of Onset     Melanoma No family hx of      Skin Cancer No family hx of        Medications:  Current Outpatient Medications   Medication Sig Dispense Refill     Albuterol Sulfate (VENTOLIN HFA IN)        ALPRAZolam (XANAX) 1 MG tablet        anastrozole (ARIMIDEX)        fluticasone-salmeterol (ADVAIR DISKUS) 250-50 MCG/DOSE diskus inhaler        hydrocortisone 2.5 % ointment Use daily to rash on face, ears, underarms, or groin 60 g 3     methotrexate sodium 2.5 MG TABS Take 5 mg by mouth once for 1 dose 2 tablet 0     triamcinolone (KENALOG) 0.1 % ointment Use at night to rash on body 454 g 3     venlafaxine (EFFEXOR-ER) 150 MG TB24 24 hr tablet Take 2 tablets (300 mg) by mouth daily 1 tablet 0     Vitamin D, Cholecalciferol, 1000 UNITS CAPS           No Known Allergies      Review of Systems:  -As per HPI  -Constitutional: Otherwise feeling well today, in usual state of health.  -HEENT: Patient denies nonhealing oral sores.  -Skin: As above in HPI. No additional skin concerns.    Physical exam:  Vitals: There were no vitals taken for this visit.  GEN: This is a well developed, well-nourished female in no acute distress, in a pleasant mood.    SKIN: Focused examination of the face, scalp, neck, back, right and left arms, hands, finger, finger nails, legs, and feet was performed.  -Finger nails covered with nail polish. Onycholysis and oil spots of the toe nails  -Scattered pink papules and plaques with minimal scale on the bilateral lower legs and the elbows. No scaling of the scalp  -Dactylitis of the left first and second digit. None of the joints are painful on  palpation  -No other lesions of concern on areas examined.     Impression/Plan:    1. Psoriasis and psoriatic arthritis    At this time, pt's cutaneous dz is at a stable place for the pt, however, her joint pain is active and start to limit her daily activities. Given hx of breast cancer, we are limited to several medications. Unfortunately, the pt did not tolerate MTX. Apremilast is an option. Although the pt does have depression, it is currently well controlled. Hydroxyurea and sulfasalazine could also be options.    Due to ongoing and progressive joint pain and our inability to closely monitor progression, we have referred the pt back to rheumatology for evaluation, and ultimately, the decision on starting a systemic therapy for her psoriatic arthritis    I have refilled the pt's TAC today      CC Referred Self, MD  No address on file on close of this encounter.  Follow-up in 3 months, earlier for new or changing lesions.       Dr. Raphael staffed the patient.    Staff Involved:  Resident(Davida)/Staff  .I, Nicolle Raphael MD, saw this patient with the resident and agree with the resident s findings and plan of care as documented in the resident s note.    Again, thank you for allowing me to participate in the care of your patient.      Sincerely,    Nicolle Raphael MD

## 2019-02-18 ENCOUNTER — OFFICE VISIT (OUTPATIENT)
Dept: RHEUMATOLOGY | Facility: CLINIC | Age: 54
End: 2019-02-18
Attending: NURSE PRACTITIONER
Payer: COMMERCIAL

## 2019-02-18 ENCOUNTER — TELEPHONE (OUTPATIENT)
Dept: RHEUMATOLOGY | Facility: CLINIC | Age: 54
End: 2019-02-18

## 2019-02-18 VITALS
DIASTOLIC BLOOD PRESSURE: 77 MMHG | WEIGHT: 209.1 LBS | OXYGEN SATURATION: 97 % | BODY MASS INDEX: 30.88 KG/M2 | HEART RATE: 71 BPM | TEMPERATURE: 98.4 F | SYSTOLIC BLOOD PRESSURE: 113 MMHG

## 2019-02-18 DIAGNOSIS — M76.32 ILIOTIBIAL BAND SYNDROME OF LEFT SIDE: ICD-10-CM

## 2019-02-18 DIAGNOSIS — M77.8 LEFT SHOULDER TENDONITIS: ICD-10-CM

## 2019-02-18 DIAGNOSIS — M19.90 INFLAMMATORY ARTHRITIS: ICD-10-CM

## 2019-02-18 DIAGNOSIS — M25.812 SHOULDER IMPINGEMENT, LEFT: ICD-10-CM

## 2019-02-18 DIAGNOSIS — L40.50 PSORIATIC ARTHRITIS (H): Primary | ICD-10-CM

## 2019-02-18 PROCEDURE — G0463 HOSPITAL OUTPT CLINIC VISIT: HCPCS | Mod: ZF

## 2019-02-18 RX ORDER — ALPRAZOLAM 1 MG
TABLET ORAL
COMMUNITY
Start: 2019-01-11 | End: 2023-11-06

## 2019-02-18 RX ORDER — VENLAFAXINE HYDROCHLORIDE 150 MG/1
300 CAPSULE, EXTENDED RELEASE ORAL DAILY
COMMUNITY
Start: 2019-02-14

## 2019-02-18 RX ORDER — TRIAMCINOLONE ACETONIDE 1 MG/G
CREAM TOPICAL
Refills: 1 | COMMUNITY
Start: 2019-01-17 | End: 2024-01-12

## 2019-02-18 RX ORDER — SULFASALAZINE 500 MG/1
500 TABLET, DELAYED RELEASE ORAL 2 TIMES DAILY
Qty: 60 TABLET | Refills: 0 | Status: SHIPPED | OUTPATIENT
Start: 2019-02-18 | End: 2019-03-29

## 2019-02-18 RX ORDER — ANASTROZOLE 1 MG/1
TABLET ORAL
Refills: 3 | COMMUNITY
Start: 2018-12-11 | End: 2023-11-06

## 2019-02-18 RX ORDER — ALBUTEROL SULFATE 90 UG/1
2 AEROSOL, METERED RESPIRATORY (INHALATION) EVERY 6 HOURS PRN
COMMUNITY
Start: 2018-07-19

## 2019-02-18 RX ORDER — PHENOL 1.4 %
1 AEROSOL, SPRAY (ML) MUCOUS MEMBRANE
COMMUNITY
End: 2023-11-06

## 2019-02-18 ASSESSMENT — PAIN SCALES - GENERAL: PAINLEVEL: NO PAIN (0)

## 2019-02-18 NOTE — TELEPHONE ENCOUNTER
Introduction: Rheumatology     Situation: seen in Rheumatology for psoriatic arthritis  In setting of breast cancer     Background: Starting sulfasalazine if oncologist ok with this, doing labs Wednesday. Active tendonitis and dactylitis, low dose methotrexate  Caused leukopenia      Assessment: Active tendonitis and dactylitis, left shoulder impingement and left dequarvain tendonitis     Recommendation:   1. Discussed the risks,benefits and possible side-effects of sulfasalazine. She wishes to try a low dose 500 mg BID. 2. Nursing, THURS Please follow-up with the patient after she sees the oncologist (WED, having comprehensive labs, mammogram and OV), make sure the labs are ok for her to start and that the oncologist is ok with her taking this. In the setting of breat cancer, I dont know if aprimilast (otezla) is ok so she is asking this as well. The labs are ok and ok with oncologist, she may start a low dose with labs every 2 weeks x 3 (unless the oncologist wants more often)   3. Aniya, please do a review to see if there are any interactions she may need MTM which I am ok with, review the aprimilast (otezla) data, and follow-up with the labs     Lee ALMANZAR, CNP, MSN   Pager: [phone]  [facility]: [phone] Opt 2, then Opt 1 Scheduling Opt 3 Nursing  [facility]: [phone] Option 7 scheduling

## 2019-02-18 NOTE — PROGRESS NOTES
"[facility] - Rheumatology Clinic Visit    Gricel Agarwal  is a 53 year old here for follow-up psoriatic arthritis, +psoriasis under care of derm (young started, 1994 sees Dr. Richards). 9-2017 +SORIN 1:320 homogenous, -RF -CCP -HIV -Hep/c -MTB QG w/no erosions on hand or feet XR (except bilateral hallux valgus 1st MTPs degenerative changes) . History breast cancer s/p radiation Started Anastrazole April 2016    Past-Enbrel 5 yrs ago 2012 so stopped not help with scalp psoriasis, prior topical steroids. Methotrexate  5 mg (per oncology who felt she may try) but dropped WBC (leukopenia after 1 dose) 3.7 so this was stopped --history elevated liver tests 2-2018  AST 91 (was not on methotrexate at this time)    Copy forward last and initial visit 9-:   Seen derm who looked at her hands and told her needs to see Rheum. Mornings with stiffness fingers, hard time making a fist, left outside hip, wrists this started 2 yr ago. Progressive the past few years \"getting up is stiff\" then better. This lasts 10 min then improved. C/o pain in always hands and fingers, dull ache ankles L>R, and low back with pain and swelling. Jang noroma in left foot. Stiff feet. +achilles pain intermittent, tendonitis in elbow, left out side hip (this is hard to sleep). Morning \"back is bad\" this has always been bad for me\"-cracks this to stretch it. Not sure if loss ROM. Does stretch. Better when gets up to move her back. Root canal and crown on 2 teeth then got ABX--needs another crown. ABX is resolved. Fingers swelling, always this been this way for a few years. Toes may swell. Migratory swelling. Left ankle laterally swelling. Fatigue     Psoriasis trunk extremities, elbows, knees, legs, scalp, inner ears, face \"plaques before not spots\". +nail changes. On Triamcinolone 0.1% ointment to body daily and Hydrocortisone 2.5% ointment to face daily.  Cream are working some. Skin worse with stress.     L breast CA " invasive ductal carcinoma in situ gradwe 2 +invasive tumor ER/NC+ there was concern DCIS with comedo type necrosis (not in lymph) did radiation in 2016 not needed chemo-[facility] Dr. Hsu this Nov is 2 years. On aimidex for 10 yr will be seeing oncologist soon     February 18, 2019  Sees Dr. Raphael (dermatology) for her psoriasis, last 2-2019.   Oncology felt she could try test dose of methotrexate for her psoriasis and psoriatic arthritis so gave her 5 mg, she did stop this as her WBC dropped after only 1 dose.     Skin psoriasis is on lower legs and elbows, and before in her scalp (cleared with coal tar shampoo from [employer]). Did not start oral or injectable medications for her skin. Has kenalog cream to use prn this is what she has had before. Did diet detox, working on weight loss and exercise. Her skin is improving with this life-style change    Noticing worst joints are her left 1st thumb joint with tendonitis, dactylitis in fingers, left 2nd finger, lower back (worse with stand or moves), right outer foot bothers her, left shoulder (hard to lift over her head) for several months, left outside of hip 9worse when lays on the left side) and left knee gelling. Morning stiffness in those areas for several hours. Wishes to try another medications. Does catering. Not able to lay on her left side. No tick bites or travels. Past depression, but no issue at this point controlled (is on effexor). No lung issues     Starting reclast for her low bone density per oncology. Seeing oncology this Wednesday, having mammogram today and labs Wednesday as well. Going to [place] March 4-14th.     Denies any fever, chills, cough, SOB, CP. No infections past year     PMH:  Medical-psoriasis, L breast cancer dx 2015 underwent radiation, root canal with crowns-got infection, irritable bowel, depression, asthma, osteopenia (4-2018 T-1.2 monitored per oncologist) PTSD, vitamin D deficiency, history elevated  "liver tests   AST 91, leukopenia with methotrexate  5 mg, chicken pox, controlled depression      Surgical-breast cancer surgery lumpectomy then lymph nodes removed 2 weeks later, HYST   Injury-left ankle ligaments torn and broke in past (sports injury 16 yr old)    FH:  No autoimmune disorders, psoriasis, UC, crohn's, SLE, RA, PsA, gout.  No MS in family  Mother-healthy, HTN, thyroid, shingles    Father-passed colon cancer  Siblings-1 brother healthy-shingles   Children-1 dtr healthy  Father's side \"all  from cancer\" pancreatic PGM; leukemia -PGF      SH:  Rare Alcohol. Never Smoking. No IVDU. 1Children. Right handed. . Works at salon and cleans houses      [facility] personally reviewed and updated by me.    ROS:  Negative raynaud s phenomena, hairloss, sun sensitivities, keratoconjunctivitis sicca, pleurisy, significant rashes like malar, oral/nasal or ulcerations, inflammatory eye disease, inflammatory bowel disease. Negative for miscarriages over 2 months into pregnancy, blood clots, gout, UC, crohn's. No temporal headache, no jaw claudication, no scalp tenderness, vision changes, carotidynia, cough  +see above   CONSTITUTIONAL: No fevers, night sweats or unintentional weight change. No acute distress, swollen glands  EYES: No vision change, diplopia, pain in eyes or red eyes   EARS, NOSE, MOUTH, THROAT: No tinnitus or hearing change, no epistaxis or nasal discharge, no oral lesions, throat clear. Normal saliva pool.  No thyroid enlargement  CARDIOVASCULAR: No chest pain, palpitations, or pain with walking, no orthopnea or PND   RESPIRATORY: No dyspnea, cough, shortness of breath or wheezing. No pleurisy.   GI: No nausea, vomiting, diarrhea or constipation, no abdominal pain, or blood in stools.   : No change in urine, no dysuria or hematuria   MUSCKL: See above    INTEGUMENTARY: No concerning lesions or moles   NEURO: No loss of strength or sensation, no numbness or tingling, no tremor, " no dizziness, no headache. No falls   ENDO: No polyuria or polydipsia, no temperature intolerance   HEME/LYMPH:No concerning bumps, bleeding problems, or swollen lymph nodes. No recent infections, hospitalizations or new illnesses.   ALLERGY: No environmental allergies   PSYCH:No depression or anxiety, no sleep problems.  Otherwise 14 point ROS obtained, reviewed and found negative.     Physical exam:  Vitals: Blood pressure 113/77, pulse 71, temperature 98.4  F (36.9  C), temperature source Oral, weight 94.8 kg (209 lb 1.6 oz), SpO2 97 %.  Wt Readings from Last 4 Encounters:   02/18/19 94.8 kg (209 lb 1.6 oz)   09/20/17 88.3 kg (194 lb 9.6 oz)     Constitutional: WD-WN-WG cooperative  Eyes: nl EOM, PERRLA, vision, conjunctiva, sclera  ENT: nl external ears, nose, hearing, lips, teeth, gums, throat  Neck: no mass or thyroid enlargement  Resp: lungs clear to auscultation, nl to palpation, nl effort  CV: RRR, no murmurs, rubs or gallops, no edema  GI: no ABD mass or tenderness, no HSM  : not tested  Lymph: no cervical or epitrochlear nodes  MS: dactylitis fingers, left thumb dequarvein tendonitis into left 2nd finger, left shoulder impingement (reduced adduction  90 degree) with cool swelling anterior shoulder, pitting and oncholysis of fingernails. Styloid laxity. B/L elbow tendonitis with tender enthesitis. +bunions. +left ITB and trochanteric bursa with enthesitis, All other MJ, neck, shoulder, elbow, wrist, hand, spine, hip, knee, ankle, and foot joints were examined and otherwise found normal. Negative Lhermitte's sign. No periuncle erythema  Skin: no alopecia. +psoriasis elbows, knees, legs, trunk and extremities   Neuro: nl cranial nerves, strength, sensation, DTRs.   Psych: nl judgement, orientation, memory, affect.      Labs/Imaging:  Results for orders placed or performed in visit on 01/29/19   Colonoscopy - HIM Scan    Narrative    [facility]  Patient Name: Gricel Lancasterradhika  Procedure  Date: 6/27/2016 8:49 AM  MRN: [id number]  YOB: 1965  Note Status: Finalized  Attending MD: SUDARSHAN GONZALEZ MD  Procedure:           Colonoscopy  Indications:         Surveillance: Personal history of adenomatous polyps on                        last colonoscopy 5 years ago  Providers:           SUDARSHAN GONZALEZ MD, Shobha Johnson RN, Andreea Irizarry RN  Referring Provider:    Requesting Provider:   Medicines:           Fentanyl 100 micrograms IV, Midazolam 3.5 mg IV  Complications:       No immediate complications.  Procedure:           Pre-Anesthesia Assessment:                       - Prior to the procedure, a History and Physical was                        performed, and patient medications and allergies were                        reviewed. The patient's tolerance of previous anesthesia                        was also reviewed. The risks and benefits of the                        procedure and the sedation options and risks were                        discussed with the patient. All questions were answered,                        and informed consent was obtained. Prior Anticoagulants:                        The patient has taken no previous anticoagulant or                        antiplatelet agents. ASA Grade Assessment: II - A                        patient with mild systemic disease. After reviewing the                        risks and benefits, the patient was deemed in                        satisfactory condition to undergo the procedure.                       Informed consent was obtained after discussion of the                        procedure including its risks of perforation, bleeding,                        potential need for surgery, potential complications                        associated with conscious sedation, and possibility of                        an incomplete procedure or missed lesion. The scope was                        passed under direct vision. Throughout the procedure,                         the patient's blood pressure, pulse, and oxygen                        saturations were monitored continuously. The Colonoscope                        was introduced through the anus and advanced to the                        terminal ileum, with identification of the appendiceal                        orifice and IC valve. The terminal ileum, ileocecal                        valve, appendiceal orifice, and rectum were                        photographed. The entire colon was examined. The                        colonoscopy was performed without difficulty. The                        patient tolerated the procedure well. The quality of the                        bowel preparation was adequate.  Findings:       The perianal and digital rectal examinations were normal.       A few small-mouthed diverticula were found in the sigmoid colon and in        the descending colon.       Non-bleeding internal hemorrhoids were found during retroflexion. The        hemorrhoids were small.       The exam was otherwise without abnormality.       The terminal ileum appeared normal.  Impression:          - Diverticulosis in the sigmoid colon and in the                        descending colon.                       - Non-bleeding internal hemorrhoids.                       - The examination was otherwise normal.                       - The examined portion of the ileum was normal.                       - No specimens collected.  Recommendation:      - Discharge patient to home (ambulatory).                       - Written discharge instructions were provided to the                        patient.                       - Repeat colonoscopy in 5 years for surveillance.                       - Return to referring physician PRN.  Procedure Code(s):   --- Professional ---                       , Colorectal cancer screening; colonoscopy on                        individual at high risk  Diagnosis Code(s):   ---  Professional ---                       Z86.010, Personal history of colonic polyps                       K64.8, Other hemorrhoids                       K57.30, Diverticulosis of large intestine without                        perforation or abscess without bleeding  CPT copyright 2014 [employer]. All rights reserved.  The codes documented in this report are preliminary and upon  review may   be revised to meet current compliance requirements.  MD SUDARSHAN Gleason MD  6/27/2016 9:39:11 AM  This report has been signed electronically.SUDARSHAN GONZALEZ MD  Number of Addenda: 0  Note Initiated On: 6/27/2016 8:49 AM  Scope Withdrawal Time 0 hours 14 minutes 17 seconds   Total Procedure Duration Time 0 hours 18 minutes 53 seconds   Status         Impression/Plan:    1. Psoriatic arthritis  2. Psoriasis  3. Left shoulder impingement  4. Trochanteric bursitis, left ITB syndrome--exercises given   5. Breast cancer, under care of oncology at [facility]     She has active psoriasis, tendonitis, synovitis, dactylitis, EAS , nail pitting and even left shoulder impingement. Meets the diagnosis of psoriatic arthritis. Active psoriatic arthritis and had tried low dose methotrexate but had leukopenia a week after. X-rays did not show any erosive changes. We discussed sulfasalazine vs aprimilast (otezla), and the sulfasalazine has a lower risk to this and short half-life. She will do her labs Wednesday, I will add CRP/ESR then she will start a low dose 500 mg BID with labs then every 2 weeks x 3. If tolerates and labs are ok, we will try to slowly increase to 1 GM BID.  Avoid anti-TNF given her breast cancer hx. She will need to review if aprimilast (otezla) is an option even with her oncologist. I would use leflunomide (arava) due to the long-half life, and since she has had elevated liver enzymes in the past and history leukopenia with very low dose methotrexate. She will update me in 4  weeks. RTC 2 month. She will discuss these options and findings with her oncologist.      Referral to Physical Therapy for her left shoulder, and     The patient understood the rational for the diagnosis and treatment plan. Patient shared in the decision making. All questions were answered to best of my ability and the patient's satisfaction  Lee ALMANZAR, CNP, MSN  [facility]  Department of Rheumatology & Autoimmune Disorders

## 2019-02-18 NOTE — PATIENT INSTRUCTIONS
Heat treatments and passive stretching exercises   Heat is applied to the outer thigh for 15 to 20 minutes to prepare the area for stretching.  The primary recovery exercise for hip bursitis is cross-leg pulls.    While in the sitting position, cross-leg pulls are performed in sets of 20 repetitions to reduce the pressure over the bursa. The set of exercises is performed once daily initially and may be increased to twice daily as tolerated.    This exercise should be combined with low back and sacroiliac stretches.        Stretching these areas increases flexibility of the lower spine, the sacroiliac joints, and the hips.   Therapeutic ultrasound provides deep heating to the area and can be combined with stretching exercises.    Daily stretching exercises may be cut back to three times a week once local symptoms have resolved.    Other measures that should be performed in the acute setting include the following:    ?Correct any underlying gait disturbance (eg, a shoe lift for leg length discrepancy, low back stretching exercises, a knee brace, high-top shoes for ankle support, custom-made foot orthotics for ankle pronation).  ?Reduce weightbearing (eg, a lean bar, sitting rather than standing, temporary crutches, weight loss for recurrent cases).  ?Restrict repetitious bending (eg, climbing stairs, getting out of a chair).  ?Avoid direct pressure on the bursa.  ?Suggest sitting with the leg moderately abducted and externally rotated to lessen the pressure over the bursa.

## 2019-02-18 NOTE — NURSING NOTE
Chief Complaint   Patient presents with     Psychiatric Problem     PSORIATIC ARTHRITIS     Felisha Angeles MA

## 2019-02-18 NOTE — LETTER
"2/18/2019      RE: Gricel Agarwal  [address] [place] [address]  [place] [address]       [facility] - Rheumatology Clinic Visit    Gricel Agarwal  is a 53 year old here for follow-up psoriatic arthritis, +psoriasis under care of derm (young started, 1994 sees Dr. Richards). 9-2017 +SORIN 1:320 homogenous, -RF -CCP -HIV -Hep/c -MTB QG w/no erosions on hand or feet XR (except bilateral hallux valgus 1st MTPs degenerative changes) . History breast cancer s/p radiation Started Anastrazole April 2016    Past-Enbrel 5 yrs ago 2012 so stopped not help with scalp psoriasis, prior topical steroids. Methotrexate  5 mg (per oncology who felt she may try) but dropped WBC (leukopenia after 1 dose) 3.7 so this was stopped --history elevated liver tests 2-2018  AST 91 (was not on methotrexate at this time)    Copy forward last and initial visit 9-:   Seen derm who looked at her hands and told her needs to see Rheum. Mornings with stiffness fingers, hard time making a fist, left outside hip, wrists this started 2 yr ago. Progressive the past few years \"getting up is stiff\" then better. This lasts 10 min then improved. C/o pain in always hands and fingers, dull ache ankles L>R, and low back with pain and swelling. Jang noroma in left foot. Stiff feet. +achilles pain intermittent, tendonitis in elbow, left out side hip (this is hard to sleep). Morning \"back is bad\" this has always been bad for me\"-cracks this to stretch it. Not sure if loss ROM. Does stretch. Better when gets up to move her back. Root canal and crown on 2 teeth then got ABX--needs another crown. ABX is resolved. Fingers swelling, always this been this way for a few years. Toes may swell. Migratory swelling. Left ankle laterally swelling. Fatigue     Psoriasis trunk extremities, elbows, knees, legs, scalp, inner ears, face \"plaques before not spots\". +nail changes. On Triamcinolone 0.1% ointment to body daily and Hydrocortisone 2.5% ointment " to face daily.  Cream are working some. Skin worse with stress.     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 Hsu this Nov is 2 years. On aimidex for 10 yr will be seeing oncologist soon     February 18, 2019  Sees Dr. Raphael (dermatology) for her psoriasis, last 2-2019.   Oncology felt she could try test dose of methotrexate for her psoriasis and psoriatic arthritis so gave her 5 mg, she did stop this as her WBC dropped after only 1 dose.     Skin psoriasis is on lower legs and elbows, and before in her scalp (cleared with coal tar shampoo from [employer]). Did not start oral or injectable medications for her skin. Has kenalog cream to use prn this is what she has had before. Did diet detox, working on weight loss and exercise. Her skin is improving with this life-style change    Noticing worst joints are her left 1st thumb joint with tendonitis, dactylitis in fingers, left 2nd finger, lower back (worse with stand or moves), right outer foot bothers her, left shoulder (hard to lift over her head) for several months,  left outside of hip 9worse when lays on the left side) and left knee gelling. Morning stiffness in those areas for several hours. Wishes to try another medications. Does catering. Not able to lay on her left side. No tick bites or travels. Past depression, but no issue at this point controlled (is on effexor). No lung issues     Starting reclast for her low bone density per oncology. Seeing oncology this Wednesday, having mammogram today and labs Wednesday as well. Going to [place] March 4-14th.     Denies any fever, chills, cough, SOB, CP. No infections past year     PMH:  Medical-psoriasis, L breast cancer dx 2015 underwent radiation, root canal with crowns-got infection, irritable bowel, depression, asthma, osteopenia (4-2018  "T-1.2 monitored per oncologist) PTSD, vitamin D deficiency, history elevated liver tests 2-  AST 91, leukopenia with methotrexate  5 mg, chicken pox, controlled depression      Surgical-breast cancer surgery lumpectomy then lymph nodes removed 2 weeks later, ST   Injury-left ankle ligaments torn and broke in past (sports injury 16 yr old)    FH:  No autoimmune disorders, psoriasis, UC, crohn's, SLE, RA, PsA, gout.  No MS in family  Mother-healthy, HTN, thyroid, shingles    Father-passed colon cancer  Siblings-1 brother healthy-shingles   Children-1 dtr healthy  Father's side \"all  from cancer\" pancreatic PGM; leukemia -PGF      SH:  Rare Alcohol. Never Smoking. No IVDU. 1Children. Right handed. . Works at salon and cleans houses      PMSH personally reviewed and updated by me.    ROS:  Negative raynaud s phenomena, hairloss, sun sensitivities, keratoconjunctivitis sicca, pleurisy, significant rashes like malar, oral/nasal or ulcerations, inflammatory eye disease, inflammatory bowel disease. Negative for miscarriages over 2 months into pregnancy, blood clots, gout, UC, crohn's. No temporal headache, no jaw claudication, no scalp tenderness, vision changes, carotidynia, cough  +see above   CONSTITUTIONAL: No fevers, night sweats or unintentional weight change. No acute distress, swollen glands  EYES: No vision change, diplopia, pain in eyes or red eyes   EARS, NOSE, MOUTH, THROAT: No tinnitus or hearing change, no epistaxis or nasal discharge, no oral lesions, throat clear. Normal saliva pool.  No thyroid enlargement  CARDIOVASCULAR: No chest pain, palpitations, or pain with walking, no orthopnea or PND   RESPIRATORY: No dyspnea, cough, shortness of breath or wheezing. No pleurisy.   GI: No nausea, vomiting, diarrhea or constipation, no abdominal pain, or blood in stools.   : No change in urine, no dysuria or hematuria   MUSCKL: See above    INTEGUMENTARY: No concerning lesions or moles "   NEURO: No loss of strength or sensation, no numbness or tingling, no tremor, no dizziness, no headache. No falls   ENDO: No polyuria or polydipsia, no temperature intolerance   HEME/LYMPH:No concerning bumps, bleeding problems, or swollen lymph nodes. No recent infections, hospitalizations or new illnesses.   ALLERGY: No environmental allergies   PSYCH:No depression or anxiety, no sleep problems.  Otherwise 14 point ROS obtained, reviewed and found negative.     Physical exam:  Vitals: Blood pressure 113/77, pulse 71, temperature 98.4  F (36.9  C), temperature source Oral, weight 94.8 kg (209 lb 1.6 oz), SpO2 97 %.  Wt Readings from Last 4 Encounters:   02/18/19 94.8 kg (209 lb 1.6 oz)   09/20/17 88.3 kg (194 lb 9.6 oz)     Constitutional: WD-WN-WG cooperative  Eyes: nl EOM, PERRLA, vision, conjunctiva, sclera  ENT: nl external ears, nose, hearing, lips, teeth, gums, throat  Neck: no mass or thyroid enlargement  Resp: lungs clear to auscultation, nl to palpation, nl effort  CV: RRR, no murmurs, rubs or gallops, no edema  GI: no ABD mass or tenderness, no HSM  : not tested  Lymph: no cervical or epitrochlear nodes  MS: dactylitis fingers, left thumb dequarvein tendonitis into left 2nd finger, left shoulder impingement (reduced adduction  90 degree) with cool swelling anterior shoulder, pitting and oncholysis of fingernails. Styloid laxity. B/L elbow tendonitis with tender enthesitis. +bunions. +left ITB and trochanteric bursa with enthesitis, All other MJ, neck, shoulder, elbow, wrist, hand, spine, hip, knee, ankle, and foot joints were examined and otherwise found normal. Negative Lhermitte's sign. No periuncle erythema  Skin: no alopecia. +psoriasis elbows, knees, legs, trunk and extremities   Neuro: nl cranial nerves, strength, sensation, DTRs.   Psych: nl judgement, orientation, memory, affect.      Labs/Imaging:  Results for orders placed or performed in visit on 01/29/19   Colonoscopy - HIM Scan     Narrative    [facility]  Patient Name: Gricel Agarwal  Procedure Date: 6/27/2016 8:49 AM  MRN: [id number]  YOB: 1965  Note Status: Finalized  Attending MD: SUDARSHAN GONZALEZ MD  Procedure:           Colonoscopy  Indications:         Surveillance: Personal history of adenomatous polyps on                        last colonoscopy 5 years ago  Providers:           SUDARSHAN GONZALEZ MD, Shobha Johnson, FRANNIE, Andreea Irizarry RN  Referring Provider:    Requesting Provider:   Medicines:           Fentanyl 100 micrograms IV, Midazolam 3.5 mg IV  Complications:       No immediate complications.  Procedure:           Pre-Anesthesia Assessment:                       - Prior to the procedure, a History and Physical was                        performed, and patient medications and allergies were                        reviewed. The patient's tolerance of previous anesthesia                        was also reviewed. The risks and benefits of the                        procedure and the sedation options and risks were                        discussed with the patient. All questions were answered,                        and informed consent was obtained. Prior Anticoagulants:                        The patient has taken no previous anticoagulant or                        antiplatelet agents. ASA Grade Assessment: II - A                        patient with mild systemic disease. After reviewing the                        risks and benefits, the patient was deemed in                        satisfactory condition to undergo the procedure.                       Informed consent was obtained after discussion of the                        procedure including its risks of perforation, bleeding,                        potential need for surgery, potential complications                        associated with conscious sedation, and possibility of                        an incomplete procedure or missed lesion. The scope  was                        passed under direct vision. Throughout the procedure,                        the patient's blood pressure, pulse, and oxygen                        saturations were monitored continuously. The Colonoscope                        was introduced through the anus and advanced to the                        terminal ileum, with identification of the appendiceal                        orifice and IC valve. The terminal ileum, ileocecal                        valve, appendiceal orifice, and rectum were                        photographed. The entire colon was examined. The                        colonoscopy was performed without difficulty. The                        patient tolerated the procedure well. The quality of the                        bowel preparation was adequate.  Findings:       The perianal and digital rectal examinations were normal.       A few small-mouthed diverticula were found in the sigmoid colon and in        the descending colon.       Non-bleeding internal hemorrhoids were found during retroflexion. The        hemorrhoids were small.       The exam was otherwise without abnormality.       The terminal ileum appeared normal.  Impression:          - Diverticulosis in the sigmoid colon and in the                        descending colon.                       - Non-bleeding internal hemorrhoids.                       - The examination was otherwise normal.                       - The examined portion of the ileum was normal.                       - No specimens collected.  Recommendation:      - Discharge patient to home (ambulatory).                       - Written discharge instructions were provided to the                        patient.                       - Repeat colonoscopy in 5 years for surveillance.                       - Return to referring physician PRN.  Procedure Code(s):   --- Professional ---                       , Colorectal cancer screening;  colonoscopy on                        individual at high risk  Diagnosis Code(s):   --- Professional ---                       Z86.010, Personal history of colonic polyps                       K64.8, Other hemorrhoids                       K57.30, Diverticulosis of large intestine without                        perforation or abscess without bleeding  CPT copyright 2014 American Medical Association. All rights reserved.  The codes documented in this report are preliminary and upon  review may   be revised to meet current compliance requirements.  MD SUDARSHAN Gleason MD  6/27/2016 9:39:11 AM  This report has been signed electronically.SUDARSHAN GONZALEZ MD  Number of Addenda: 0  Note Initiated On: 6/27/2016 8:49 AM  Scope Withdrawal Time 0 hours 14 minutes 17 seconds   Total Procedure Duration Time 0 hours 18 minutes 53 seconds   Status         Impression/Plan:    1. Psoriatic arthritis  2. Psoriasis  3. Left shoulder impingement  4. Trochanteric bursitis, left ITB syndrome--exercises given   5. Breast cancer, under care of oncology at [facility]     She has active psoriasis, tendonitis, synovitis, dactylitis, EAS , nail pitting and even left shoulder impingement. Meets the diagnosis of psoriatic arthritis. Active psoriatic arthritis and had tried low dose methotrexate but had leukopenia a week after. X-rays did not show any erosive changes. We discussed sulfasalazine vs aprimilast (otezla), and the sulfasalazine has a lower risk to this and short half-life. She will do her labs Wednesday, I will add CRP/ESR then she will start a low dose 500 mg BID with labs then every 2 weeks x 3. If tolerates and labs are ok, we will try to slowly increase to 1 GM BID.  Avoid anti-TNF given her breast cancer hx. She will need to review if aprimilast (otezla) is an option even with her oncologist. I would use leflunomide (arava) due to the long-half life, and since she has had elevated liver enzymes in  the past and history leukopenia with very low dose methotrexate. She will update me in 4 weeks. RTC 2 month. She will discuss these options and findings with her oncologist.      Referral to Physical Therapy for her left shoulder, and     The patient understood the rational for the diagnosis and treatment plan. Patient shared in the decision making. All questions were answered to best of my ability and the patient's satisfaction      JENELLE Her CNP

## 2019-02-21 NOTE — TELEPHONE ENCOUNTER
Gricel was contacted to talk about appointment with oncologist.  Unfortunately, patient cancelled the oncology appointment for 2/20/19 and is now rescheduled for 3/25/19 so she has done any labs and will not do any until 3/25/19.      Gricel is going out of town and will be back from vacation on 3/14/19 and will continue to followup with her oncologist.     She has not started and will not start any new medications until she sees her oncologist.     She has no additional questions at this time.     Odalis Hart MSN, RN  Rheumatology RN Care Coordinator  [facility]

## 2019-02-22 NOTE — TELEPHONE ENCOUNTER
Will hold off on contacting patient given note that she will not be starting any medications until she sees Oncology. Please re-route if needed after she sees oncology and is open to starting.

## 2019-02-27 ENCOUNTER — THERAPY VISIT (OUTPATIENT)
Dept: PHYSICAL THERAPY | Facility: CLINIC | Age: 54
End: 2019-02-27
Attending: NURSE PRACTITIONER
Payer: COMMERCIAL

## 2019-02-27 DIAGNOSIS — L40.50 PSORIATIC ARTHRITIS (H): ICD-10-CM

## 2019-02-27 DIAGNOSIS — M25.812 SHOULDER IMPINGEMENT, LEFT: ICD-10-CM

## 2019-02-27 DIAGNOSIS — M19.90 INFLAMMATORY ARTHRITIS: ICD-10-CM

## 2019-02-27 DIAGNOSIS — M76.32 ILIOTIBIAL BAND SYNDROME OF LEFT SIDE: ICD-10-CM

## 2019-02-27 DIAGNOSIS — G89.29 CHRONIC LEFT SHOULDER PAIN: ICD-10-CM

## 2019-02-27 DIAGNOSIS — M77.8 LEFT SHOULDER TENDONITIS: ICD-10-CM

## 2019-02-27 DIAGNOSIS — M25.512 CHRONIC LEFT SHOULDER PAIN: ICD-10-CM

## 2019-02-27 PROCEDURE — 97110 THERAPEUTIC EXERCISES: CPT | Mod: GP | Performed by: PHYSICAL THERAPIST

## 2019-02-27 PROCEDURE — 97161 PT EVAL LOW COMPLEX 20 MIN: CPT | Mod: GP | Performed by: PHYSICAL THERAPIST

## 2019-02-27 PROCEDURE — 97140 MANUAL THERAPY 1/> REGIONS: CPT | Mod: GP | Performed by: PHYSICAL THERAPIST

## 2019-02-27 NOTE — PROGRESS NOTES
[facility] Initial Evaluation  Subjective:    Gricel Agarwal is a 53 year old female with a left shoulder condition.  Occurance: psoriasis.    This is a chronic condition  MD fernando 2/18/19.  Gricel has h/o psoriatic arthritis for the past 2 years. She has been diagnosed with psoriasis for the past 20 years. She thinks her motion is restricted reaches out to the side of the body. She is unable to sleep on her left side. She is limited with reaching activities. She met with her provider and was referred to PT for improving her ROM. .    Patient reports pain:  Posterior.  Radiates to:  Upper arm.  Pain is described as aching and sharp and is intermittent and reported as 5/10.  Associated symptoms:  Loss of motion/stiffness, loss of strength and painful arc. Pain is the same all the time.  Symptoms are exacerbated by lying on extremity, using arm behind back and using arm overhead and relieved by ice.  Since onset symptoms are unchanged.        General health as reported by patient is good.  Pertinent medical history includes:  Asthma, cancer, depression and overweight (breast).  Medical allergies: no.  Other surgeries include:  Cancer surgery (Lumpectomy for breast cnacer, lymphectomy and radiation therapy).  Current medications:  Anti-depressants and meds to increase bone density (cancer).    Employment status: caterer.  Primary job tasks include:  Prolonged standing and repetitive tasks.                                Objective:  System                   Shoulder Evaluation:  ROM:  AROM:    Flexion:  Left:  118 degrees      Extension: Left: 49 degrees  Abduction:  Left: 95 degrees     Adduction:   Left:  WFL    Internal Rotation:  Left:  L5 level      External Rotation:  Left:  71 degrees                          Strength:  normal (right side)  Flexion: Left:4/5   Pain:      Extension:  Left: 5/5    Pain:      Abduction:  Left: 4-/5  Pain:      Adduction:  Left: 5/5    Pain:      Internal Rotation:   Left:5/5     Pain:      External Rotation:   Left:5/5     Pain:     Horizontal Abduction:  Left:4/5     Pain:            Stability Testing:  normal        Palpation:    Left shoulder tenderness present at:  Acrimioclavicular; Supraspinatus; Infraspinatus and Teres Minor    Mobility Tests:    Glenohumeral anterior left:  Hypomobile    Glenohumeral posterior left:  Hypomobile    Glenohumeral inferior left:  Hypomobile                                             General     ROS    Assessment/Plan:    Patient is a 53 year old female with left side shoulder complaints.    Patient has the following significant findings with corresponding treatment plan.                Diagnosis 1:  Left shoulder pain- Psoriatic arthritis/ Stiffness  Pain -  hot/cold therapy, manual therapy, STS, self management, education and home program  Decreased ROM/flexibility - manual therapy, therapeutic exercise, therapeutic activity and home program  Decreased strength - therapeutic exercise, therapeutic activities and home program  Decreased function - therapeutic activities and home program    Therapy Evaluation Codes:   1) History comprised of:   Personal factors that impact the plan of care:      Time since onset of symptoms.    Comorbidity factors that impact the plan of care are:      Check HPI.     Medications impacting care: Check HPI.  2) Examination of Body Systems comprised of:   Body structures and functions that impact the plan of care:      Shoulder.   Activity limitations that impact the plan of care are:      Cooking, Dressing, Lifting, Sleeping and reaching.  3) Clinical presentation characteristics are:   Stable/Uncomplicated.  4) Decision-Making    Low complexity using standardized patient assessment instrument and/or measureable assessment of functional outcome.  Cumulative Therapy Evaluation is: Low complexity.    Previous and current functional limitations:  (See Goal Flow Sheet for this information)    Short term and Long  term goals: (See Goal Flow Sheet for this information)     Communication ability:  Patient appears to be able to clearly communicate and understand verbal and written communication and follow directions correctly.  Treatment Explanation - The following has been discussed with the patient:   RX ordered/plan of care  Anticipated outcomes  Possible risks and side effects  This patient would benefit from PT intervention to resume normal activities.   Rehab potential is good.    Frequency:  1 X week, once daily  Duration:  for 6 weeks  Discharge Plan:  Achieve all LTG.  Independent in home treatment program.  Reach maximal therapeutic benefit.    Please refer to the daily flowsheet for treatment today, total treatment time and time spent performing 1:1 timed codes.

## 2019-03-11 ENCOUNTER — TELEPHONE (OUTPATIENT)
Dept: RHEUMATOLOGY | Facility: CLINIC | Age: 54
End: 2019-03-11

## 2019-03-14 DIAGNOSIS — M77.8 LEFT SHOULDER TENDONITIS: ICD-10-CM

## 2019-03-14 DIAGNOSIS — L40.50 PSORIATIC ARTHRITIS (H): ICD-10-CM

## 2019-03-14 DIAGNOSIS — M19.90 INFLAMMATORY ARTHRITIS: ICD-10-CM

## 2019-03-14 DIAGNOSIS — M76.32 ILIOTIBIAL BAND SYNDROME OF LEFT SIDE: ICD-10-CM

## 2019-03-15 ENCOUNTER — THERAPY VISIT (OUTPATIENT)
Dept: PHYSICAL THERAPY | Facility: CLINIC | Age: 54
End: 2019-03-15
Attending: NURSE PRACTITIONER
Payer: COMMERCIAL

## 2019-03-15 DIAGNOSIS — L40.50 PSORIATIC ARTHRITIS (H): ICD-10-CM

## 2019-03-15 DIAGNOSIS — M25.512 CHRONIC LEFT SHOULDER PAIN: ICD-10-CM

## 2019-03-15 DIAGNOSIS — G89.29 CHRONIC LEFT SHOULDER PAIN: ICD-10-CM

## 2019-03-15 PROCEDURE — 97140 MANUAL THERAPY 1/> REGIONS: CPT | Mod: GP | Performed by: PHYSICAL THERAPIST

## 2019-03-15 PROCEDURE — 97110 THERAPEUTIC EXERCISES: CPT | Mod: GP | Performed by: PHYSICAL THERAPIST

## 2019-03-15 PROCEDURE — 97112 NEUROMUSCULAR REEDUCATION: CPT | Mod: GP | Performed by: PHYSICAL THERAPIST

## 2019-03-16 NOTE — TELEPHONE ENCOUNTER
sulfaSALAzine ER (AZULFIDINE EN) 500 MG EC tablet  Last Written Prescription Date:  2/18/19  Last Fill Quantity: 60,   # refills: 0  Last Office Visit: 2/18/19  Future Office visit: none    CBC RESULTS:   Recent Labs   Lab Test 11/20/17  0935   WBC 3.7*   RBC 4.08   HGB 13.3   HCT 39.7   MCV 97   MCH 32.6   MCHC 33.5   RDW 13.2          Creatinine   Date Value Ref Range Status   09/20/2017 0.60 0.52 - 1.04 mg/dL Final   ]    Liver Function Studies -   Recent Labs   Lab Test 11/20/17  0935   PROTTOTAL 6.8   ALBUMIN 3.3*   BILITOTAL 0.4   ALKPHOS 126   AST 21   ALT 33       Routing refill request to provider for review/approval because: labs past due

## 2019-03-18 ENCOUNTER — TELEPHONE (OUTPATIENT)
Dept: RHEUMATOLOGY | Facility: CLINIC | Age: 54
End: 2019-03-18

## 2019-03-18 NOTE — TELEPHONE ENCOUNTER
Gricel is calling to update on her medication  Salfasalzine 500mg.  She states she has an appointment with her oncologist 3/26/2019, and would like to wait until after this appointment to start taking this medication.     Gricel has picked up Sulfasalazine but has not started taking it.    Medication does not need refill because it is a new, full bottle.     Gricel will send a [employer] message and let us know the start of her Sulfasalazine.     Odalis Hart MSN, RN  Rheumatology RN Care Coordinator  [facility]

## 2019-03-18 NOTE — TELEPHONE ENCOUNTER
Left message for Gricel to call back at [phone]; would like to followup and see how things are going.     Will send a [employer] message as well.      Odalis Hart MSN, RN  Rheumatology RN Care Coordinator  [facility]

## 2019-03-20 ENCOUNTER — THERAPY VISIT (OUTPATIENT)
Dept: PHYSICAL THERAPY | Facility: CLINIC | Age: 54
End: 2019-03-20
Payer: COMMERCIAL

## 2019-03-20 DIAGNOSIS — M25.512 CHRONIC LEFT SHOULDER PAIN: ICD-10-CM

## 2019-03-20 DIAGNOSIS — L40.50 PSORIATIC ARTHRITIS (H): ICD-10-CM

## 2019-03-20 DIAGNOSIS — G89.29 CHRONIC LEFT SHOULDER PAIN: ICD-10-CM

## 2019-03-20 PROCEDURE — 97112 NEUROMUSCULAR REEDUCATION: CPT | Mod: GP | Performed by: PHYSICAL THERAPIST

## 2019-03-20 PROCEDURE — 97140 MANUAL THERAPY 1/> REGIONS: CPT | Mod: GP | Performed by: PHYSICAL THERAPIST

## 2019-03-20 PROCEDURE — 97110 THERAPEUTIC EXERCISES: CPT | Mod: GP | Performed by: PHYSICAL THERAPIST

## 2019-03-21 RX ORDER — SULFASALAZINE 500 MG/1
500 TABLET, DELAYED RELEASE ORAL 2 TIMES DAILY
Qty: 60 TABLET | Refills: 0 | OUTPATIENT
Start: 2019-03-21

## 2019-03-26 ENCOUNTER — TRANSFERRED RECORDS (OUTPATIENT)
Dept: HEALTH INFORMATION MANAGEMENT | Facility: CLINIC | Age: 54
End: 2019-03-26

## 2019-04-01 ENCOUNTER — TELEPHONE (OUTPATIENT)
Dept: RHEUMATOLOGY | Facility: CLINIC | Age: 54
End: 2019-04-01

## 2019-04-01 ENCOUNTER — TELEPHONE (OUTPATIENT)
Dept: PHARMACY | Facility: CLINIC | Age: 54
End: 2019-04-01

## 2019-04-01 ENCOUNTER — ALLIED HEALTH/NURSE VISIT (OUTPATIENT)
Dept: PHARMACY | Facility: CLINIC | Age: 54
End: 2019-04-01

## 2019-04-01 DIAGNOSIS — L40.50 PSORIATIC ARTHRITIS (H): ICD-10-CM

## 2019-04-01 DIAGNOSIS — L40.50 PSORIATIC ARTHRITIS (H): Primary | ICD-10-CM

## 2019-04-01 PROCEDURE — 99207 ZZC NO CHARGE LOS: CPT | Performed by: PHARMACIST

## 2019-04-01 NOTE — TELEPHONE ENCOUNTER
Prior Authorization Approval    Authorization Effective Date: 3/2/2019  Authorization Expiration Date: 7/30/2019  Medication: Otelza 10MG & 20MG & 30MG Starter Tablet Pack (APPROVED)  Approved Dose/Quantity: 55  Reference #: CMM KEY: [id number]   Insurance Company: [employer] - Phone [phone] Fax [phone]  Expected CoPay:       CoPay Card Available: Yes    Foundation Assistance Needed:    Which Pharmacy is filling the prescription (Not needed for infusion/clinic administered): [address] Approved for Otezla - Patient is restricted to fill with [facility].

## 2019-04-01 NOTE — PROGRESS NOTES
Therapy Management:                                                    Gricel Agarwal is a 53 year old female called for a therapy management visit.  She was referred to me from JENELLE Ward CNP .     Reason for Consult: New Otezla start    Discussion: Called Gricel to discuss her new medication, Otezla.  She has not taken this medication before and is excited to try it.    Discussed her medication list, the list in Epic is correct and she denies any additional OTC medications or herbal supplements.  There are no drug-drug interactions with her current medication list.     Discussed how to take Otezla, particularly the details of the starter pack titration.  Advised to take with or without food.  Also advised to not take two doses at once if a dose is missed. Went over potential side effects particularly risk for diarrhea and nausea.  Advised she may see weight loss as well with Otezla. Gricel will purchase some imodium to have on hand in case she experiences diarrhea upon starting.  Also cautioned to monitor for signs of depression and infecton. Assured her most side effects occur during the first two weeks after starting and typically will go away with continued use but to please call if she is experiencing anything unusual or bothersome. She verbalized understanding of this.     She will need to fill Otezla through [facility], provided contact information for this pharmacy via [employer].      Plan:  1. Start taking Otezla starter pack as described in starter pack instructions  2. Please reach out if you have concerns or questions      Charlene Velasquez PharmD  [facility] Pharmacist  Phone: [phone]  E-mail: [email or web address]

## 2019-04-01 NOTE — TELEPHONE ENCOUNTER
Left message for Gricel to please call me back - will  on new medication.      No PHI left    Evy ValentineD  [facility] Pharmacist  Phone: [phone]  E-mail: [email or web address]

## 2019-04-08 ENCOUNTER — MYC MEDICAL ADVICE (OUTPATIENT)
Dept: RHEUMATOLOGY | Facility: CLINIC | Age: 54
End: 2019-04-08

## 2019-04-08 DIAGNOSIS — L40.50 PSORIATIC ARTHRITIS (H): ICD-10-CM

## 2019-05-06 ENCOUNTER — MYC REFILL (OUTPATIENT)
Dept: RHEUMATOLOGY | Facility: CLINIC | Age: 54
End: 2019-05-06

## 2019-05-06 DIAGNOSIS — L40.50 PSORIATIC ARTHRITIS (H): ICD-10-CM

## 2019-05-06 DIAGNOSIS — L40.9 PSORIASIS: Primary | ICD-10-CM

## 2019-05-13 ENCOUNTER — OFFICE VISIT (OUTPATIENT)
Dept: DERMATOLOGY | Facility: CLINIC | Age: 54
End: 2019-05-13
Payer: COMMERCIAL

## 2019-05-13 ENCOUNTER — MYC REFILL (OUTPATIENT)
Dept: RHEUMATOLOGY | Facility: CLINIC | Age: 54
End: 2019-05-13

## 2019-05-13 ENCOUNTER — OFFICE VISIT (OUTPATIENT)
Dept: ORTHOPEDICS | Facility: CLINIC | Age: 54
End: 2019-05-13
Payer: COMMERCIAL

## 2019-05-13 VITALS — DIASTOLIC BLOOD PRESSURE: 87 MMHG | SYSTOLIC BLOOD PRESSURE: 125 MMHG

## 2019-05-13 DIAGNOSIS — S61.309A AVULSION OF FINGERNAIL OF RIGHT HAND: Primary | ICD-10-CM

## 2019-05-13 DIAGNOSIS — L40.9 PSORIASIS: Primary | ICD-10-CM

## 2019-05-13 DIAGNOSIS — L40.50 PSORIATIC ARTHRITIS (H): ICD-10-CM

## 2019-05-13 DIAGNOSIS — L40.9 PSORIASIS: ICD-10-CM

## 2019-05-13 DIAGNOSIS — I78.1 NON-NEOPLASTIC NEVUS OF SKIN: ICD-10-CM

## 2019-05-13 RX ORDER — CALCIPOTRIENE 50 UG/G
OINTMENT TOPICAL 2 TIMES DAILY
Qty: 120 G | Refills: 11 | Status: SHIPPED | OUTPATIENT
Start: 2019-05-13 | End: 2023-10-31

## 2019-05-13 ASSESSMENT — PAIN SCALES - GENERAL: PAINLEVEL: NO PAIN (0)

## 2019-05-13 NOTE — NURSING NOTE
Chief Complaint   Patient presents with     Derm Problem     Gricel is here today to be seen for Psoriasis - Patient states she is doing well and notes improvement. She is out of Oteszla and need a Refill.      Tanya Crespo LPN

## 2019-05-13 NOTE — LETTER
Patient:  Gricel Agarwal  :   1965  MRN:     [id number]      May 13, 2019    To whom it may concern:    Gricel Agarwal is under my professional care for right finger injuries and may return to work on 2019   with the following restrictions:     -limited use of the right hand including lifting and carrying < 5lbs. Needs to keep injury sites clean, dry, and covered.     Restrictions to remain in place until 4 week follow up appointment.        Please contact our office with questions or concerns.     Sincerely,        Moreno Hylton MD              [phone]  1965

## 2019-05-13 NOTE — PROGRESS NOTES
NEW PATIENT INTAKE QUESTIONNAIRE  SPORTS & ORTHOPEDIC WALK-IN 5/13/2019    Primary Care Physician: Dr. Rivers    Where are the majority of your medical records?     Was seen in ER on 5/3/19 for crushed middle and ring finger. Was told to see an orthopedic surgeon within 3 days- scheduled appointment but is out of the 3 days she was given, not sure why she needs to see an orthopedic surgeon.       Reason for Visit:    What part of your body is injured / painful?  right middle and ring finger    What caused the injury /pain? Work injury    How long ago did your injury occur or pain begin? several days ago    What are your most bothersome symptoms?     How would you characterize your symptom?     What makes your symptoms better? Nothing    What makes your symptoms worse?     Have you been previously seen for this problem? Yes, ER    Medical History:    Medical History:     Have you had surgery on this body part before? No    Medications:     Allergies: No known drug allergies    Family History of Medical Problems:     Previous Surgeries:       Review of Systems:    Have you recently had a a fever, chills, weight loss? No    Do you have any vision problems? No    Do you have any chest pain or edema? No    Do you have any shortness of breath or wheezing?  No    Do you have stomach problems? No    Do you have any numbness or focal weakness? No    Do you have diabetes? No    Do you have problems with bleeding or clotting? No    Do you have an rashes or other skin lesions? No

## 2019-05-13 NOTE — PROGRESS NOTES
[facility]  Orthopedics  Moreno Hylton MD  2019     Name: Gricel Agarwal  MRN: [id number]  Age: 53 year old  : 1965  Referring provider: Referred Self     Chief Complaint: Finger injury     Date of Injury: 5/3/19    History of Present Illness:   Gricel Agarwal is a 53 year old, right handed female who presents today for evaluation of finger injury.  The patient sustained crush injuries to her right middle and ring finger on 5/3/19 when her fingers became trapped in a loading ramp on a truck as it was forcibly slid into the truck by a colleague.  She had lacerations to the tips of her middle and ring finger.   She was seen at [facility] where her lacerations were repaired, including nails of both those fingers removed due to the amount of damage.  X-ray showed no fracture.  She was told to follow up with orthopedics in 3 days however was not told why.  The pain from her lacerations is improving and there has not been any purulent drainage from the wounds.  She still has some numbness in the tips of her fingers however this is improving and she is able move her fingers without difficulty.      Review of Systems:   A 10-point review of systems was obtained and is negative except for as noted in the HPI.     Medications:      albuterol (VENTOLIN HFA) 108 (90 Base) MCG/ACT inhaler, Inhale 2 puffs into the lungs, Disp: , Rfl:      ALPRAZolam (XANAX) 1 MG tablet, Take 1 tab by mouth 3 times per day as needed for anxiety or sleep, Disp: , Rfl:      anastrozole (ARIMIDEX) 1 MG tablet, TAKE 1 TABLET (1 MG) BY MOUTH ONCE DAILY., Disp: , Rfl: 3     apremilast (OTEZLA) 30 MG tablet, Take 1 tablet (30 mg) by mouth 2 times daily Hold for signs of infection, and seek medical attention., Disp: 60 tablet, Rfl: 5     calcipotriene (DOVONOX) 0.005 % external ointment, Apply topically 2 times daily with triamcinolone, Disp: 120 g, Rfl: 11     calcium carbonate (OS-CHUY 600 MG [place]. [place]) 600 MG tablet, Take 1 tablet by mouth, Disp: ,  Rfl:      Cholecalciferol (VITAMIN D3) 1000 units CAPS, , Disp: , Rfl:      fluticasone-salmeterol (ADVAIR DISKUS) 250-50 MCG/DOSE inhaler, Inhale 1 puff into the lungs, Disp: , Rfl:      hydrocortisone 2.5 % ointment, Use daily to rash on face, ears, underarms, or groin (Patient not taking: Reported on 2/12/2019), Disp: 60 g, Rfl: 3     IRON PO, Take 1 tablet by mouth, Disp: , Rfl:      triamcinolone (KENALOG) 0.1 % external cream, APPLY 1 APPLICATION TOPICALLY TO AFFECTED SKIN AREA(S) TWICE A DAY., Disp: , Rfl: 1     triamcinolone (KENALOG) 0.1 % external ointment, Use at night to rash on body, Disp: 454 g, Rfl: 3     venlafaxine (EFFEXOR-XR) 150 MG 24 hr capsule, Take 300 mg by mouth, Disp: , Rfl:      Vitamin D, Cholecalciferol, 1000 UNITS CAPS, , Disp: , Rfl:      Zoledronic Acid (RECLAST IV), , Disp: , Rfl:     Allergies:  No known drug allergies.     Past Medical History:  Psoriatic arthritis     Past Surgical History:  No significant surgical history     Social History:  Works as a caterer. She denies tobacco use.     Family History:  No pertinent family history.     Physical Examination:  General: Alert, but, no distress  Right hand: Warm and well-perfused.  Cap refill is brisk on the distal fingers including third and fourth fingers.  On the ring finger there is a flail spacer in place of the nail.  This appears well adhered.  On the volar aspect of the distal ring finger there is a well-healing laceration.  Sutures in place.  No drainage or surrounding erythema.  On the distal middle finger there is a large scab over the finger in place the nail.  Able to flex and extend at the DIP against resistance without weakness or significant pain.  There is no warmth or surrounding erythema of either wound.  Sensation is intact to light touch on all fingertips.    Assessment:   53 year old, right handed female with crush injury to the right middle and ring fingers with damage to the nailbeds at subsequent nail  avulsion/removal.  Currently doing well.  Laceration appears well-healed and likely ready for suture removal today.    Plan:   Sutures removed today and wounds were re-dressed appropriately.  Discussed with hand surgery who recommended no current intervention for her fingernail on the middle finger..  Continue to follow clinically.  She can use her hand as tolerated, although will have to keep wounds generally clean and dry.  Recommended continue covering wounds with Vaseline and gauze to prevent from drying out completely.  Provided referral to hand therapy for splints to protect the nails during regrowth.  Provided letter for work with restrictions.  Follow up in 4 weeks for reassessment.      Moreno Hylton MD    Scribe Disclosure:  I, Swati Giraldo, am serving as a scribe to document services personally performed by Moreno Hylton MD at this visit, based upon the provider's statements to me. All documentation has been reviewed by the aforementioned provider prior to being entered into the official medical record.

## 2019-05-13 NOTE — PROGRESS NOTES
[facility] Dermatology Note      Dermatology Problem List:    1.Psoriasis and psoriatic arthritis.    --Current:    Otezla per rheumatology started 4/2019   Triamcinolone 0.1% ointment/calcipotriene ointment (added 5/2019)  --Past: Enbrel used back in 2012, methotrexate test dose 2018 with leukopenia  --Consider future adjunctive nbUVB  --H/o breast cancer    Encounter Date: May 13, 2019    CC:   Follow up for psoriasis    History of Present Illness:  Ms. Gricel Agarwal is a 53 year old female who presents as a follow-up for psoriasis and psoriatic arthritis. The patient was last seen 2/12/2019.  She feels like her psoriasis is doing quite well with triamcinolone 0.1% ointment twice daily.  Still more persistent on the elbows.  One month ago started on apremilast per rheumatology.  She does not feel like her improvement is likely from the apremilast, but she is hoping for future improvement in joint symptoms.  She spends a lot of time outside and finds that her psoriasis is UV-responsive.    Trauma to fingers last Friday and had to go to ER for partial reattachment on right hand.    Past Medical History:   Patient Active Problem List   Diagnosis     Left shoulder pain     Psoriatic arthritis (H)     No past medical history on file.  No past surgical history on file.    Social History:  Patient reports that she has never smoked. She has never used smokeless tobacco.    Family History:  Family History   Problem Relation Age of Onset     Melanoma No family hx of      Skin Cancer No family hx of    Grandmother had pancreatic cancer    Medications:  Current Outpatient Medications   Medication Sig Dispense Refill     albuterol (VENTOLIN HFA) 108 (90 Base) MCG/ACT inhaler Inhale 2 puffs into the lungs       ALPRAZolam (XANAX) 1 MG tablet Take 1 tab by mouth 3 times per day as needed for anxiety or sleep       anastrozole (ARIMIDEX) 1 MG tablet TAKE 1 TABLET (1 MG) BY MOUTH ONCE DAILY.  3     apremilast  (OTEZLA) 30 MG tablet Take 1 tablet (30 mg) by mouth 2 times daily Hold for signs of infection, and seek medical attention. 60 tablet 5     calcium carbonate (OS-CHUY 600 MG [place]. [place]) 600 MG tablet Take 1 tablet by mouth       Cholecalciferol (VITAMIN D3) 1000 units CAPS        fluticasone-salmeterol (ADVAIR DISKUS) 250-50 MCG/DOSE inhaler Inhale 1 puff into the lungs       IRON PO Take 1 tablet by mouth       triamcinolone (KENALOG) 0.1 % external ointment Use at night to rash on body 454 g 3     venlafaxine (EFFEXOR-XR) 150 MG 24 hr capsule Take 300 mg by mouth       Zoledronic Acid (RECLAST IV)        hydrocortisone 2.5 % ointment Use daily to rash on face, ears, underarms, or groin (Patient not taking: Reported on 2/12/2019) 60 g 3     triamcinolone (KENALOG) 0.1 % external cream APPLY 1 APPLICATION TOPICALLY TO AFFECTED SKIN AREA(S) TWICE A DAY.  1     Vitamin D, Cholecalciferol, 1000 UNITS CAPS           No Known Allergies      Review of Systems:  -As per HPI  -Constitutional: Otherwise feeling well today, in usual state of health.  -HEENT: Patient denies nonhealing oral sores.  -Skin: As above in HPI. No additional skin concerns.    Physical exam:  Vitals: /87 (BP Location: Right arm, Patient Position: Sitting, Cuff Size: Adult Regular)   GEN: This is a well developed, well-nourished female in no acute distress, in a pleasant mood.    SKIN: Full skin exam head, neck, chest, abdomen, back, arms, legs, buttocks  -Diffusely tan  -Finger nails covered with nail polish. Onycholysis and oil spots of the toe nails  -Scattered pink papules and plaques with minimal scale on the bilateral lower legs and the elbows. No scaling of the scalp  -Many 2-4mm brown macules and papules on trunk and extremities  -No other lesions of concern on areas examined.     Impression/Plan:    1. Psoriasis and psoriatic arthritis--improved    Apremilast per rheumatology started 4/2019    Triamcinolone 0.1% ointment    Add  calcipotriene ointment    Consider future nbUVB if necessary      CC Referred Self, MD  No address on file on close of this encounter.  Follow-up in 5 months, earlier for new or changing lesions.       Dr. Raphael staffed the patient.    Staff Involved:  Resident(Kaitlyn)/Staff  .I, Nicolle Raphael MD, saw this patient with the resident and agree with the resident s findings and plan of care as documented in the resident s note.

## 2019-05-13 NOTE — LETTER
5/13/2019       RE: Gricel Agarwal  [address] [place] [id number]     Dear Colleague,    Thank you for referring your patient, Gricel Agarwal, to the [facility] DERMATOLOGY at [facility]. Please see a copy of my visit note below.    [facility] Dermatology Note      Dermatology Problem List:    1.Psoriasis and psoriatic arthritis.    --Current:    Otezla per rheumatology started 4/2019   Triamcinolone 0.1% ointment/calcipotriene ointment (added 5/2019)  --Past: Enbrel used back in 2012, methotrexate test dose 2018 with leukopenia  --Consider future adjunctive nbUVB  --H/o breast cancer    Encounter Date: May 13, 2019    CC:   Follow up for psoriasis    History of Present Illness:  Ms. Gricel Agarwal is a 53 year old female who presents as a follow-up for psoriasis and psoriatic arthritis. The patient was last seen 2/12/2019.  She feels like her psoriasis is doing quite well with triamcinolone 0.1% ointment twice daily.  Still more persistent on the elbows.  One month ago started on apremilast per rheumatology.  She does not feel like her improvement is likely from the apremilast, but she is hoping for future improvement in joint symptoms.  She spends a lot of time outside and finds that her psoriasis is UV-responsive.    Trauma to fingers last Friday and had to go to ER for partial reattachment on right hand.    Past Medical History:   Patient Active Problem List   Diagnosis     Left shoulder pain     Psoriatic arthritis (H)     No past medical history on file.  No past surgical history on file.    Social History:  Patient reports that she has never smoked. She has never used smokeless tobacco.    Family History:  Family History   Problem Relation Age of Onset     Melanoma No family hx of      Skin Cancer No family hx of    Grandmother had pancreatic cancer    Medications:  Current Outpatient Medications   Medication Sig Dispense Refill     albuterol  (VENTOLIN HFA) 108 (90 Base) MCG/ACT inhaler Inhale 2 puffs into the lungs       ALPRAZolam (XANAX) 1 MG tablet Take 1 tab by mouth 3 times per day as needed for anxiety or sleep       anastrozole (ARIMIDEX) 1 MG tablet TAKE 1 TABLET (1 MG) BY MOUTH ONCE DAILY.  3     apremilast (OTEZLA) 30 MG tablet Take 1 tablet (30 mg) by mouth 2 times daily Hold for signs of infection, and seek medical attention. 60 tablet 5     calcium carbonate (OS-CHUY 600 MG [place]. [place]) 600 MG tablet Take 1 tablet by mouth       Cholecalciferol (VITAMIN D3) 1000 units CAPS        fluticasone-salmeterol (ADVAIR DISKUS) 250-50 MCG/DOSE inhaler Inhale 1 puff into the lungs       IRON PO Take 1 tablet by mouth       triamcinolone (KENALOG) 0.1 % external ointment Use at night to rash on body 454 g 3     venlafaxine (EFFEXOR-XR) 150 MG 24 hr capsule Take 300 mg by mouth       Zoledronic Acid (RECLAST IV)        hydrocortisone 2.5 % ointment Use daily to rash on face, ears, underarms, or groin (Patient not taking: Reported on 2/12/2019) 60 g 3     triamcinolone (KENALOG) 0.1 % external cream APPLY 1 APPLICATION TOPICALLY TO AFFECTED SKIN AREA(S) TWICE A DAY.  1     Vitamin D, Cholecalciferol, 1000 UNITS CAPS           No Known Allergies      Review of Systems:  -As per HPI  -Constitutional: Otherwise feeling well today, in usual state of health.  -HEENT: Patient denies nonhealing oral sores.  -Skin: As above in HPI. No additional skin concerns.    Physical exam:  Vitals: /87 (BP Location: Right arm, Patient Position: Sitting, Cuff Size: Adult Regular)   GEN: This is a well developed, well-nourished female in no acute distress, in a pleasant mood.    SKIN: Full skin exam head, neck, chest, abdomen, back, arms, legs, buttocks  -Diffusely tan  -Finger nails covered with nail polish. Onycholysis and oil spots of the toe nails  -Scattered pink papules and plaques with minimal scale on the bilateral lower legs and the elbows. No scaling of the  scalp  -Many 2-4mm brown macules and papules on trunk and extremities  -No other lesions of concern on areas examined.     Impression/Plan:    1. Psoriasis and psoriatic arthritis--improved    Apremilast per rheumatology started 4/2019    Triamcinolone 0.1% ointment    Add calcipotriene ointment    Consider future nbUVB if necessary      CC Referred Self, MD  No address on file on close of this encounter.  Follow-up in 5 months, earlier for new or changing lesions.       Dr. Raphael staffed the patient.    Staff Involved:  Resident([facility])/Staff  .I, Nicolle Raphael MD, saw this patient with the resident and agree with the resident s findings and plan of care as documented in the resident s note.    Again, thank you for allowing me to participate in the care of your patient.      Sincerely,    Nicolle Raphael MD

## 2019-05-15 NOTE — TELEPHONE ENCOUNTER
I discontinued the aprimilast (otezla). Her pharmacy needs to be called then and the other provider give her an rx so she should know this. I dont know if she tried other starter pack or if did the response. She as well seen a dermatology here 5-13 who wrote about her starting the aprimilast (otezla)

## 2019-05-15 NOTE — TELEPHONE ENCOUNTER
Called and spoke with pt regarding medication, she was told by the pharmacy that the doctor should have told her about the co-pay card.  She is very upset so she will be finding a new rheumatologist.      Did try to discuss with her, but she was adamant that it was the responsibility of our clinic to do this, she is not happy so she will not be coming here.    Krystina Rabago RN  Rheumatology Clinic

## 2019-05-16 ENCOUNTER — THERAPY VISIT (OUTPATIENT)
Dept: OCCUPATIONAL THERAPY | Facility: CLINIC | Age: 54
End: 2019-05-16
Payer: OTHER MISCELLANEOUS

## 2019-05-16 DIAGNOSIS — M25.641 STIFFNESS OF FINGER JOINT OF RIGHT HAND: ICD-10-CM

## 2019-05-16 DIAGNOSIS — M79.644 PAIN OF FINGER OF RIGHT HAND: ICD-10-CM

## 2019-05-16 PROBLEM — M25.512 LEFT SHOULDER PAIN: Status: RESOLVED | Noted: 2019-02-27 | Resolved: 2019-05-16

## 2019-05-16 PROBLEM — L40.50 PSORIATIC ARTHRITIS (H): Status: RESOLVED | Noted: 2019-02-27 | Resolved: 2019-05-16

## 2019-05-16 PROCEDURE — 97140 MANUAL THERAPY 1/> REGIONS: CPT | Mod: GO | Performed by: OCCUPATIONAL THERAPIST

## 2019-05-16 PROCEDURE — 97110 THERAPEUTIC EXERCISES: CPT | Mod: GO | Performed by: OCCUPATIONAL THERAPIST

## 2019-05-16 PROCEDURE — 97760 ORTHOTIC MGMT&TRAING 1ST ENC: CPT | Mod: GO | Performed by: OCCUPATIONAL THERAPIST

## 2019-05-16 PROCEDURE — 97165 OT EVAL LOW COMPLEX 30 MIN: CPT | Mod: GO | Performed by: OCCUPATIONAL THERAPIST

## 2019-05-16 NOTE — TELEPHONE ENCOUNTER
Called and spoke with pt, no issues with GI upset, except for the first couple of days.  She is tolerating well, no weight loss or depression.    Pt is appreciative of the continuing medication for 3 months until she can establish elsewhere.    Krystina Rabago RN  Rheumatology Clinic

## 2019-05-16 NOTE — PROGRESS NOTES
Hand Therapy Initial Evaluation    Current Date:  5/16/2019    Diagnosis: Avulsion of fingernail of right hand; long and ring fingers  DOI: 05/03/19   Procedure:  sutures  Post:  1w 6d    Precautions: NA    Subjective:  Gricel Agarwal is a 53 year old right hand dominant female.    Patient reports symptoms of pain, stiffness/loss of motion, weakness/loss of strength, edema and numbness of the right long and ring fingers which occurred due to was working and pushing ramp under truck, fingers got caught. Since onset symptoms are Gradually getting better.  Special tests:  x-ray.  Previous treatment: tetanus shot, sutures.    General health as reported by patient is fair.  Pertinent medical history includes:Asthma, Cancer, Depression, Overweight, psoriatic arthritis  Medical allergies:none.  Surgical history: cancer: breast.  Medication history: Anti-depressants, Bone Density, armiadix, otezla.    Occupational Profile Information:  Current occupation is Catering/  Currently not working due to present treatment problem  Job Tasks: Lifting, Carrying, Prolonged Sitting, Pushing, Pulling, Repetitive Tasks  Prior functional level:  no limitations  Barriers include:none  Mobility: No difficulty  Transportation: public transportation, biking  Leisure activities/hobbies: biking, gardening    Functional Outcome Measure:   Upper Extremity Functional Index Score:  SCORE:   Column Totals: /80: 46   (A lower score indicates greater disability.)    Objective:  Pain Level (Scale 0-10):   5/16/2019   At Rest 0/10   At Worst 5/10     Pain Description:  Date 5/16/2019   Location R long finger and ring finger   Pain Quality Sharp, Throbbing and shocking   Frequency intermittent     Pain is worst  daytime   Exacerbated by  movement, use, bumping it   Relieved by rest   Progression improving     Finger Edema  Circumference:  (Measured in cm)  Edema 5/16/2019   side Right   Long P1 6.0   PIP 5.8   P2 5.3   DIP 5.5   Ring P1 5.9   PIP  5.5   P2 5.0   DIP 5.3     Sensation:  Decreased at tips of long and ring finger    ROM  HAND 5/16/2019   AROM(PROM) right   Long MP -4/47   PIP -13/72   DIP -8/25   GALDAMEZ 119   Ring MP 0/43   PIP -14/80   DIP 0/15   GALDAMEZ 124     Strength:  Contraindicated    Assessment:  Patient presents with symptoms consistent with diagnosis of Avulsion of fingernail of right hand; long and ring fingers,  with conservative intervention.     Patient's limitations or Problem List includes:  Pain, Decreased ROM/motion, Increased edema, Weakness and Sensory disturbance of the right long finger and ring finger which interferes with the patient's ability to perform Self Care Tasks (dressing, eating, bathing, hygiene/toileting), Work Tasks, Sleep Patterns, Recreational Activities and Household Chores as compared to previous level of function.    Rehab Potential:  Good - Return to full activity, some limitations    Patient will benefit from skilled Occupational Therapy to increase ROM and overall strength and decrease pain, edema and adherence of scarring to return to previous activity level and resume normal daily tasks and to reach their rehab potential.    Barriers to Learning:  No barrier    Communication Issues:  Patient appears to be able to clearly communicate and understand verbal and written communication and follow directions correctly.    Chart Review: Chart Review and Simple history review with patient    Identified Performance Deficits: bathing/showering, toileting, dressing, feeding, hygiene and grooming, health management and maintenance, home establishment and management, meal preparation and cleanup, work and leisure activities    Assessment of Occupational Performance:  5 or more Performance Deficits    Clinical Decision Making (Complexity): Low complexity    Treatment Explanation:  The following has been discussed with the patient:  RX ordered/plan of care  Anticipated outcomes  Possible risks and side  effects    Plan:  Frequency:  1 X week, once daily  Duration:  for 4 weeks tapering to 2 X a month over 8 weeks    Treatment Plan:   Modalities:  US, Fluidotherapy and Paraffin  Therapeutic Exercise:  AROM, AAROM, PROM, Tendon Gliding and Blocking  Neuromuscular re-education:  Nerve Gliding  Manual Techniques:  Scar mobilization and Myofascial release  Orthotic Fabrication:  Static orthosis  Self Care:  Self Care Tasks  Discharge Plan:  Achieve all LTG.  Independent in home treatment program.  Reach maximal therapeutic benefit.    Home Exercise Program:  Tip protectors for long and ring  AROM of fingers  Scar massage    Next Visit:  AROM  Scar mgmt

## 2019-05-16 NOTE — TELEPHONE ENCOUNTER
Please get update from her on the tolerance of this  And if any side-effects (GI, weight loss, depression) and if she is improving, I will give her a few months so she can find a new rheumatologist. I want her to feel good about who she sees. I wish her the best

## 2019-05-23 ENCOUNTER — THERAPY VISIT (OUTPATIENT)
Dept: OCCUPATIONAL THERAPY | Facility: CLINIC | Age: 54
End: 2019-05-23
Payer: OTHER MISCELLANEOUS

## 2019-05-23 DIAGNOSIS — M79.644 PAIN OF FINGER OF RIGHT HAND: ICD-10-CM

## 2019-05-23 DIAGNOSIS — M25.641 STIFFNESS OF FINGER JOINT OF RIGHT HAND: ICD-10-CM

## 2019-05-23 PROCEDURE — 97110 THERAPEUTIC EXERCISES: CPT | Mod: GO | Performed by: OCCUPATIONAL THERAPIST

## 2019-05-23 PROCEDURE — 97140 MANUAL THERAPY 1/> REGIONS: CPT | Mod: GO | Performed by: OCCUPATIONAL THERAPIST

## 2019-05-23 NOTE — PROGRESS NOTES
SOAP Note - Hand Therapy - Objective Information    Current Date:  5/23/2019    Diagnosis: Avulsion of fingernail of right hand; long and ring fingers  DOI: 05/03/19   Procedure:  sutures  Post:  2w 6d    Precautions: CHRISTIAN      Gricel Agarwal is a 53 year old right hand dominant female.    Patient reports symptoms of pain, stiffness/loss of motion, weakness/loss of strength, edema and numbness of the right long and ring fingers which occurred due to was working and pushing ramp under truck, fingers got caught.     Occupational Profile Information:  Current occupation is [employer]    S:   Subjective changess noted by patient: the fingers are much better.  I lost the splint for the ring finger.  It just fell off  Functional changes noted by patient: using hand more for ADLs      O:  Pain Level (Scale 0-10):   5/16/2019 5/23/19   At Rest 0/10 0/10   At Worst 5/10 3/10     Pain Description:  Date 5/16/2019   Location R long finger and ring finger   Pain Quality Sharp, Throbbing and shocking   Frequency intermittent     Pain is worst  daytime   Exacerbated by  movement, use, bumping it   Relieved by rest   Progression improving     Finger Edema  Circumference:  (Measured in cm)  Edema 5/16/2019 5/23/19   side Right Right   Long P1 6.0    PIP 5.8    P2 5.3    DIP 5.5    Ring P1 5.9 5.9   PIP 5.5 5.5   P2 5.0 5.0   DIP 5.3      Sensation:  Decreased at tips of long and ring finger    ROM  HAND 5/16/2019 5/23/19   AROM(PROM) right Right   Long MP -4/47 /75   PIP -13/72 /90   DIP -8/25 /35   GALDAMEZ 119    Ring MP 0/43 /55   PIP -14/80 /95   DIP 0/15 /30   GALDAMEZ 124      Strength:Contraindicated    Please refer to the daily flowsheet for treatment provided today.     Home Exercise Program:  Tip protectors for long and ring  AROM of fingers  Scar massage  [place] pad for tips - needs to purchase    Next Visit:  AROM  Scar mgmt

## 2019-05-30 ENCOUNTER — THERAPY VISIT (OUTPATIENT)
Dept: OCCUPATIONAL THERAPY | Facility: CLINIC | Age: 54
End: 2019-05-30
Payer: OTHER MISCELLANEOUS

## 2019-05-30 DIAGNOSIS — M79.644 PAIN OF FINGER OF RIGHT HAND: ICD-10-CM

## 2019-05-30 DIAGNOSIS — M25.641 STIFFNESS OF FINGER JOINT OF RIGHT HAND: ICD-10-CM

## 2019-05-30 PROCEDURE — 97140 MANUAL THERAPY 1/> REGIONS: CPT | Mod: GO | Performed by: OCCUPATIONAL THERAPIST

## 2019-05-30 PROCEDURE — 97112 NEUROMUSCULAR REEDUCATION: CPT | Mod: GO | Performed by: OCCUPATIONAL THERAPIST

## 2019-05-30 PROCEDURE — 97110 THERAPEUTIC EXERCISES: CPT | Mod: GO | Performed by: OCCUPATIONAL THERAPIST

## 2019-05-30 NOTE — PROGRESS NOTES
SOAP Note - Hand Therapy - Objective Information    Current Date:  5/30/2019    Diagnosis: Avulsion of fingernail of right hand; long and ring fingers  DOI: 05/03/19   Procedure:  sutures  Post:  3w 6d    Precautions: CHRISTIAN      Gricel Agarwal is a 53 year old right hand dominant female.    Patient reports symptoms of pain, stiffness/loss of motion, weakness/loss of strength, edema and numbness of the right long and ring fingers which occurred due to was working and pushing ramp under truck, fingers got caught.     Occupational Profile Information:  Current occupation is [employer]    S:   Subjective changess noted by patient: The fingers are doing well  Functional changes noted by patient: Using hands more because they are less sensitive      O:  Pain Level (Scale 0-10):   5/16/2019 5/23/19 5/30/19   At Rest 0/10 0/10 0/10   At Worst 5/10 3/10 3/10     Pain Description:  Date 5/16/2019   Location R long finger and ring finger   Pain Quality Sharp, Throbbing and shocking   Frequency intermittent     Pain is worst  daytime   Exacerbated by  movement, use, bumping it   Relieved by rest   Progression improving     Finger Edema  Circumference:  (Measured in cm)  Edema 5/16/2019 5/23/19   side Right Right   Long P1 6.0    PIP 5.8    P2 5.3    DIP 5.5    Ring P1 5.9 5.9   PIP 5.5 5.5   P2 5.0 5.0   DIP 5.3      Sensation:  Decreased at tips of long and ring finger    ROM  HAND 5/16/2019 5/23/19   AROM(PROM) right Right   Long MP -4/47 /75   PIP -13/72 /90   DIP -8/25 /35   GALDAMEZ 119    Ring MP 0/43 /55   PIP -14/80 /95   DIP 0/15 /30   GALDAMEZ 124      Strength:Contraindicated    Please refer to the daily flowsheet for treatment provided today.     Home Exercise Program:  Tip protectors for long and ring  AROM of fingers  Scar massage  [place] pad for tips    Next Visit:  AROM  Scar mgmt

## 2019-06-06 ENCOUNTER — THERAPY VISIT (OUTPATIENT)
Dept: OCCUPATIONAL THERAPY | Facility: CLINIC | Age: 54
End: 2019-06-06
Payer: OTHER MISCELLANEOUS

## 2019-06-06 DIAGNOSIS — M25.641 STIFFNESS OF FINGER JOINT OF RIGHT HAND: ICD-10-CM

## 2019-06-06 DIAGNOSIS — M79.644 PAIN OF FINGER OF RIGHT HAND: ICD-10-CM

## 2019-06-06 PROCEDURE — 97140 MANUAL THERAPY 1/> REGIONS: CPT | Mod: GO | Performed by: OCCUPATIONAL THERAPIST

## 2019-06-06 PROCEDURE — 97110 THERAPEUTIC EXERCISES: CPT | Mod: GO | Performed by: OCCUPATIONAL THERAPIST

## 2019-06-06 NOTE — PROGRESS NOTES
SOAP Note - Hand Therapy - Objective Information    Current Date:  5/30/2019    Diagnosis: Avulsion of fingernail of right hand; long and ring fingers  DOI: 05/03/19   Procedure:  sutures  Post:  4w 6d    Precautions: CHRISTIAN      Gricel Agarwal is a 53 year old right hand dominant female.    Patient reports symptoms of pain, stiffness/loss of motion, weakness/loss of strength, edema and numbness of the right long and ring fingers which occurred due to was working and pushing ramp under truck, fingers got caught.     Occupational Profile Information:  Current occupation is [employer]ing[employer]    S:   Subjective changess noted by patient: the fingers are getting better  Functional changes noted by patient: using hands more.      O:  Pain Level (Scale 0-10):   5/16/2019 5/23/19 6/6/19   At Rest 0/10 0/10 0/10   At Worst 5/10 3/10 3/10     Pain Description:  Date 5/16/2019   Location R long finger and ring finger   Pain Quality Sharp, Throbbing and shocking   Frequency intermittent     Pain is worst  daytime   Exacerbated by  movement, use, bumping it   Relieved by rest   Progression improving     Finger Edema  Circumference:  (Measured in cm)  Edema 5/16/2019 5/23/19   side Right Right   Long P1 6.0    PIP 5.8    P2 5.3    DIP 5.5    Ring P1 5.9 5.9   PIP 5.5 5.5   P2 5.0 5.0   DIP 5.3      Sensation:  Decreased at tips of long and ring finger    ROM  HAND 5/16/2019 5/23/19   AROM(PROM) right Right   Long MP -4/47 /75   PIP -13/72 /90   DIP -8/25 /35   GALDAMEZ 119    Ring MP 0/43 /55   PIP -14/80 /95   DIP 0/15 /30   GALDAMEZ 124      Strength   Painfree (Measured in pounds)  Pain Report:  + mild    ++ moderate    +++ severe    6/6/2019 6/6/2019   Trials Left Right   1  2  3 43  46  40 26  16  17     Average 43 20     Lat Pinch 6/6/2019 6/6/2019   Trials Left Right   1  2  3 17 12   Average       3 Pt Pinch 6/6/2019 6/6/2019   Trials Left Right   1  2  3 19 9   Average         Please refer to the daily flowsheet for treatment  provided today.     Home Exercise Program:  Tip protectors for long and ring  AROM of fingers  Scar massage  [place] pad for tips  Gentle putty  and pinch strengthening    Next Visit:  AROM  Scar mgmt  Strengthening

## 2019-06-11 ENCOUNTER — THERAPY VISIT (OUTPATIENT)
Dept: OCCUPATIONAL THERAPY | Facility: CLINIC | Age: 54
End: 2019-06-11
Payer: OTHER MISCELLANEOUS

## 2019-06-11 DIAGNOSIS — M25.641 STIFFNESS OF FINGER JOINT OF RIGHT HAND: ICD-10-CM

## 2019-06-11 DIAGNOSIS — M79.644 PAIN OF FINGER OF RIGHT HAND: ICD-10-CM

## 2019-06-11 PROCEDURE — 97110 THERAPEUTIC EXERCISES: CPT | Mod: GO | Performed by: OCCUPATIONAL THERAPIST

## 2019-06-11 PROCEDURE — 97140 MANUAL THERAPY 1/> REGIONS: CPT | Mod: GO | Performed by: OCCUPATIONAL THERAPIST

## 2019-06-11 NOTE — PROGRESS NOTES
SOAP Note - Hand Therapy - Objective Information    Current Date: 6/11/2019    Diagnosis: Avulsion of fingernail of right hand; long and ring fingers  DOI: 05/03/19   Procedure:  sutures  Post:  5w 4d    Precautions: CHRISTIAN      Gricel Agarwal is a 53 year old right hand dominant female.    Patient reports symptoms of pain, stiffness/loss of motion, weakness/loss of strength, edema and numbness of the right long and ring fingers which occurred due to was working and pushing ramp under truck, fingers got caught.     Occupational Profile Information:  Current occupation is [employer]ing/[employer]    S:   Subjective changess noted by patient: I see the MD on Thursday and I hope he can put another cover on the nail.  Functional changes noted by patient: using hands more.      O:  Pain Level (Scale 0-10):   5/16/2019 5/23/19 6/11/19   At Rest 0/10 0/10 0/10   At Worst 5/10 3/10 3/10     Pain Description:  Date 5/16/2019   Location R long finger and ring finger   Pain Quality Sharp, Throbbing and shocking   Frequency intermittent     Pain is worst  daytime   Exacerbated by  movement, use, bumping it   Relieved by rest   Progression improving     Finger Edema  Circumference:  (Measured in cm)  Edema 5/16/2019 5/23/19   side Right Right   Long P1 6.0    PIP 5.8    P2 5.3    DIP 5.5    Ring P1 5.9 5.9   PIP 5.5 5.5   P2 5.0 5.0   DIP 5.3      Sensation:  Decreased at tips of long and ring finger    ROM  HAND 5/16/2019 5/23/19 6/11/19   AROM(PROM) right Right Right   Long MP -4/47 /75 /78   PIP -13/72 /90 /95   DIP -8/25 /35 /45   GALDAMEZ 119     Ring MP 0/43 /55 /80   PIP -14/80 /95 /97   DIP 0/15 /30 /40   GALDAMEZ 124       Strength   Painfree (Measured in pounds)  Pain Report:  + mild    ++ moderate    +++ severe    6/6/2019 6/6/2019 6/11/19 6/11/19   Trials Left Right Left Right   1  2  3 43  46  40 26  16  17   58  54  61 45  41  37   Average 43 20 57 41     Lat Pinch 6/6/2019 6/6/2019 6/11/19   Trials Left Right Right   1  2  3 17 12 17    Average        3 Pt Pinch 6/6/2019 6/6/2019 6/11/19   Trials Left Right  Right   1  2  3 19 9 9   Average          Please refer to the daily flowsheet for treatment provided today.     Home Exercise Program:  Tip protectors for long and ring  AROM of fingers  Scar massage  [place] pad for tips  Gentle putty  and pinch strengthening    Next Visit:  AROM  Scar mgmt  Strengthening

## 2019-06-13 ENCOUNTER — OFFICE VISIT (OUTPATIENT)
Dept: ORTHOPEDICS | Facility: CLINIC | Age: 54
End: 2019-06-13
Payer: OTHER MISCELLANEOUS

## 2019-06-13 ENCOUNTER — TELEPHONE (OUTPATIENT)
Dept: ORTHOPEDICS | Facility: CLINIC | Age: 54
End: 2019-06-13

## 2019-06-13 DIAGNOSIS — S61.309A AVULSION OF FINGERNAIL OF RIGHT HAND: Primary | ICD-10-CM

## 2019-06-13 NOTE — LETTER
[facility] SPORTS AND ORTHOPAEDIC WALK IN CLINIC  [address]  [address] [place] [facility] [address]  [phone]        June 13, 2019    Regarding:  Gricel Agarwal  [address] [place] [id number]              To Whom It May Concern;  Gricel has no lifting restrictions. However, she needs to keep her fingers dry, clean, and covered.          Sincerely,        Moreno Hylton MD
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
## 2019-06-13 NOTE — PROGRESS NOTES
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 (Starting Tuesday)  Wants to get back into full swing of work, mostly concerned about bumping it, we can provide a soft cushion.    Interval History:     Follow up reason: 4 week follow up    Date of injury: 5/3/19    Date last seen: 5/13/19    Following Therapeutic Plan: Yes     Pain: Improving    Function: Improving      Medical History:    Any recent changes to your medical history? No    Any new medication prescribed since last visit? No    Review of Systems:    Do you have fever, chills, weight loss? No    Do you have any vision problems? No    Do you have any chest pain or edema? No    Do you have any shortness of breath or wheezing?  No    Do you have stomach problems? No    Do you have any numbness or focal weakness? No    Do you have diabetes? No    Do you have problems with bleeding or clotting? No    Do you have an rashes or other skin lesions? No

## 2019-06-13 NOTE — TELEPHONE ENCOUNTER
We saw a MyChart message from Gricel mentioning that the letter we initially gave her at the end of the visit was not enough for her work comp.  I called her letting her know that I updated the letter to be as specific as possible. She said it would be OK to send it to her email.     - Da CRUZ ATC

## 2019-06-19 NOTE — PROGRESS NOTES
Subjective:  HPI                    Objective:  System    Physical Exam    General     ROS    Discharge Note    Progress reporting period is from last progress note on   to Mar 20, 2019.    Gricel failed to follow up and current status is unknown.  Please see information below for last relevant information on current status.  Patient seen for 3 visits.    SUBJECTIVE  Subjective changes noted by patient:  Gricel doing very well, minimal to no pain with reaching activities emntioned  .  Current pain level is 1/10.     Previous pain level was   .   Changes in function:  Yes (See Goal flowsheet attached for changes in current functional level)  Adverse reaction to treatment or activity: None    OBJECTIVE  Changes noted in objective findings: Shoulder AROM is WFL. Improving scap stabilization.  will progress with strengthening exercises next week     ASSESSMENT/PLAN  Diagnosis: L shoulder pain- Stiffness form Psoriatric arthritis   Updated problem list and treatment plan:     STG/LTGs have been met or progress has been made towards goals:  Yes, please see goal flowsheet for most current information  Assessment of Progress: current status is unknown.    Last current status: Pt is progressing well   Self Management Plans:  HEP  I have re-evaluated this patient and find that the nature, scope, duration and intensity of the therapy is appropriate for the medical condition of the patient.  Gricel continues to require the following intervention to meet STG and LTG's:  HEP.    Recommendations:  Discharge with current home program.  Patient to follow up with MD as needed.    Please refer to the daily flowsheet for treatment today, total treatment time and time spent performing 1:1 timed codes.

## 2019-07-08 ENCOUNTER — TELEPHONE (OUTPATIENT)
Dept: RHEUMATOLOGY | Facility: CLINIC | Age: 54
End: 2019-07-08

## 2019-09-09 PROBLEM — M25.641 STIFFNESS OF FINGER JOINT OF RIGHT HAND: Status: RESOLVED | Noted: 2019-05-16 | Resolved: 2019-09-09

## 2019-09-09 PROBLEM — M79.644 PAIN OF FINGER OF RIGHT HAND: Status: RESOLVED | Noted: 2019-05-16 | Resolved: 2019-09-09

## 2019-09-09 NOTE — PROGRESS NOTES
Discharge Summary - Hand Therapy    Patient did not return to therapy . Please refer to the last SOAP note for objective details and goal achievement.  We will assume that patient's goals were met.    D/C from [facility].

## 2019-10-01 ENCOUNTER — TELEPHONE (OUTPATIENT)
Dept: RHEUMATOLOGY | Facility: CLINIC | Age: 54
End: 2019-10-01

## 2019-10-01 NOTE — TELEPHONE ENCOUNTER
[facility] Call Center    Phone Message    May a detailed message be left on voicemail: no    Reason for Call: Other: Kathya, with [facility], calling in wanting to get a verbal prior  authorization for pt's Otezla. Please follow up with Kathya at the numbers provided. Thank you       Action Taken: Message routed to:  [facility] ([facility] Rheum  .

## 2019-10-02 ENCOUNTER — HEALTH MAINTENANCE LETTER (OUTPATIENT)
Age: 54
End: 2019-10-02

## 2019-10-02 NOTE — TELEPHONE ENCOUNTER
Central Prior Authorization Team   Phone: [phone]    PA Initiation    Medication: OTEZLA  Insurance Company: [employer] - Phone [phone] Fax [phone]  Pharmacy Filling the Rx: [address]  Filling Pharmacy Phone: [phone]  Filling Pharmacy Fax: [phone]  Start Date: 10/2/2019    INSURANCE REP CALLED PA TEAM LINE.  QUESTIONS FOR PA ANSWERED VIA PHONE.  DETERMINATION FAX WILL BE SENT IN 24/72 HOURS.

## 2019-11-21 ENCOUNTER — MYC REFILL (OUTPATIENT)
Dept: DERMATOLOGY | Facility: CLINIC | Age: 54
End: 2019-11-21

## 2019-11-21 DIAGNOSIS — L40.0 PSORIASIS VULGARIS: ICD-10-CM

## 2019-11-21 RX ORDER — TRIAMCINOLONE ACETONIDE 1 MG/G
OINTMENT TOPICAL
Qty: 454 G | Refills: 3 | Status: CANCELLED | OUTPATIENT
Start: 2019-11-21

## 2019-11-21 NOTE — TELEPHONE ENCOUNTER
triamcinolone (KENALOG) 0.1 % external ointment      my chart request: RF available  Pharmacy called and confirmed RF available

## 2019-12-20 DIAGNOSIS — L40.9 PSORIASIS: ICD-10-CM

## 2019-12-20 DIAGNOSIS — L40.50 PSORIATIC ARTHRITIS (H): ICD-10-CM

## 2019-12-23 RX ORDER — APREMILAST 30 MG/1
TABLET, FILM COATED ORAL
Qty: 60 TABLET | Refills: 1 | Status: SHIPPED | OUTPATIENT
Start: 2019-12-23 | End: 2020-02-28

## 2019-12-23 NOTE — TELEPHONE ENCOUNTER
apremilast (OTEZLA) 30 MG tablet      Last Written Prescription Date:  5/16/19  Last Fill Quantity: 60,   # refills: 2  Last Office Visit : 2/18/19  Future Office visit:  none    Routing refill request to provider for review/approval because:    Medication is reported/historical    Overdue office visit    Scheduling has been notified to contact the pt for appointment.

## 2019-12-24 DIAGNOSIS — L40.9 PSORIASIS: ICD-10-CM

## 2019-12-24 DIAGNOSIS — L40.50 PSORIATIC ARTHRITIS (H): ICD-10-CM

## 2019-12-27 RX ORDER — APREMILAST 30 MG/1
TABLET, FILM COATED ORAL
Qty: 60 TABLET | Refills: 12 | OUTPATIENT
Start: 2019-12-27

## 2020-02-27 ENCOUNTER — TELEPHONE (OUTPATIENT)
Dept: RHEUMATOLOGY | Facility: CLINIC | Age: 55
End: 2020-02-27

## 2020-02-27 NOTE — TELEPHONE ENCOUNTER
Left message for pt reminding them of upcoming appointment.  Instructed pt to bring updated medications list.  tia barlow

## 2020-02-28 ENCOUNTER — OFFICE VISIT (OUTPATIENT)
Dept: RHEUMATOLOGY | Facility: CLINIC | Age: 55
End: 2020-02-28
Attending: INTERNAL MEDICINE
Payer: COMMERCIAL

## 2020-02-28 VITALS
HEIGHT: 69 IN | DIASTOLIC BLOOD PRESSURE: 80 MMHG | WEIGHT: 201.6 LBS | BODY MASS INDEX: 29.86 KG/M2 | OXYGEN SATURATION: 98 % | SYSTOLIC BLOOD PRESSURE: 132 MMHG | TEMPERATURE: 98.2 F | HEART RATE: 58 BPM

## 2020-02-28 DIAGNOSIS — L40.9 PSORIASIS: ICD-10-CM

## 2020-02-28 DIAGNOSIS — L40.50 PSORIATIC ARTHRITIS (H): ICD-10-CM

## 2020-02-28 PROCEDURE — G0463 HOSPITAL OUTPT CLINIC VISIT: HCPCS | Mod: ZF

## 2020-02-28 ASSESSMENT — MIFFLIN-ST. JEOR: SCORE: 1578.83

## 2020-02-28 ASSESSMENT — PAIN SCALES - GENERAL: PAINLEVEL: MILD PAIN (3)

## 2020-02-28 NOTE — PATIENT INSTRUCTIONS
Diagnosis:   1. Psoriatic arthritis, improved  2. Decreased right knee range of motion and pain   3. Left rotator cuff tendonitis    Plan:   - Obtain right knee MRI   - Continue Otezla 30 mg twice daily   - Continue triamcinolone and calcipotriene per Dr. Raphael   - Repeat labs (creatinine, liver function tests, and blood counts) every 6 months (ok to follow Oncology labs for this purpose)

## 2020-02-28 NOTE — LETTER
2020       RE: Gricel Agarwal  [address] [facility] [address]     Dear Colleague,    Thank you for referring your patient, Gricel Agarwal, to the [facility] RHEUMATOLOGY at [facility]. Please see a copy of my visit note below.    [facility]  Rheumatology Clinic  Irwin Rivers MD  2020     Name: Gricel Agarwal  MRN: [id number]  Age: 54 year old  : 1965  Referring provider: Tejal Rivers     Assessment and Plan:  # Psoriatic arthritis (less than 10% body surface area psoriasis, young adult age of onset; nail and scalp involvement; oligoarthritis; no axial symptoms):  Patient relates significantly improved overall control of psoriasis since starting apremilast in May 2019. Left shoulder and right knee pain persist. Exam shows crepitus at the right knee with tenderness at the lateral joint line. There is impingement at the left shoulder. Blood work from 2020 showed that electrolytes, liver function, and creatinine were all normal. Hand and foot x-rays from  showed no erosions.     I think that psoriatic arthritis is significantly improved since May 2019. R knee pain likely reflects mechanical symptoms from internal derangement of ligaments or menisci. L shoulder pain reflects infraspinatus tendonitis. I recommend continuing apremilast 30 mg twice daily. She should have CBC, creatinine, and LFTs approximately every 6 months while on apremilast. I agree with Lee Ross's assessment that she should not receive TNF inhibitors due to history of breast cancer; she is not a candidate for methotrexate due to history of leukopenia. I recommend that she continue calcipotriene and triamcinolone creams per Dr. Raphael for complementary management of psoriasis.     # Right knee pain:   Query synovitis vs internal derangement. I agree with primary provider Silvestre's recommendation to obtain MRI, in view of possible soft tissue involvement, including  meniscal tear vs ligamentous injury.     # Rotator cuff tendonitis, left:   There is no evidence of shoulder range of motion defect. I recommend continuing vigorous physical activity to maintain range of motion.     Follow-up: Return in about 6 months (around 8/28/2020).     HPI:   Gricel Agarwal is a 54 year old female with a history of psoriatic arthritis who presents for transfer of care. 9-2017 +SORIN 1:320 homogenous, -RF -CCP -HIV -Hep/c -MTB QG w/no erosions on hand or feet XR (except bilateral hallux valgus 1st MTPs degenerative changes) . History breast cancer s/p radiation Started Anastrazole April 2016. She last saw Lee Ross on 02/18/2019. Psoriatic arthritis was active. She developed leukopenia a week after starting methotrexate. Plan was to start sulfasalazine 500 mg BID and if well tolerated slowly increase to 1 gram BID.    Past-Enbrel 5 yrs ago 2012 so stopped not help with scalp psoriasis, prior topical steroids. Methotrexate 5 mg (per oncology who felt she may try) but dropped WBC (leukopenia after 1 dose) 3.7 so this was stopped --history elevated liver tests 2-2018  AST 91 (was not on methotrexate at this time). Sulfasalazine was proposed in 3-19, but never started, because oncology recommended Otezla instead. Otezla was begun in 04/2019.     Today she reports that skin has been clear with use of Otezla and daily topicals. She most often uses triamcinolone. She reports that her skin has never been this clear. However, she feels like her joint pain is getting worse. She has right knee pain with reduced straightening of the right knee. Pain is constant and not worse with standing for prolonged periods of time. Pain is worse however with prolonged rest. Stiffness last for 30-45 minutes. The knee does occasionally feel like it might give out. Going up the stairs is more painful than going down. She does have swelling and warmth in that knee. She has had pain in the right knee for 6-7 months.  She has also had left shoulder pain, which is overall improved with physical therapy. She also has pain at the bases of the thumbs. She has numbness in the right arm at night. She has low back pain. She gets Reclast yearly and has an upcoming DEXA scan. Low back pain has improved with shoe inserts. She occasionally has tingling and sudden shocking sensation in her left heel.    Patient saw Dr. Raphael in 05/2019 in follow up of psoriasis. Improvement in both skin and joint disease was noted and apremilast was recommended, in additional to triamcinolone and calcipotriene topical treatments.     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 She was a  in the past. She played volleyball and softball when she was younger.     Review of Systems:   Pertinent items are noted in HPI or as below, remainder of complete ROS is negative.      No recent problems with hearing or vision. No swallowing problems.   No breathing difficulty, shortness of breath, coughing, or wheezing  No chest pain or palpitations  No heart burn, indigestion, abdominal pain, nausea, vomiting, diarrhea  No urination problems, no bloody, cloudy urine, no dysuria  No weakness  No headaches or confusion  No easy bleeding or bruising.   + psoriasis   + left shoulder pain   + right knee pain   + heel tingling   + right arm numbness   + low back pain     Active Medications:     Current Outpatient Medications:      albuterol (VENTOLIN HFA) 108 (90 Base) MCG/ACT inhaler, Inhale 2 puffs into the lungs, Disp: , Rfl:      ALPRAZolam (XANAX) 1 MG tablet, Take 1 tab by mouth 3 times per day as needed for anxiety or sleep, Disp: , Rfl:      anastrozole (ARIMIDEX) 1 MG tablet, TAKE 1 TABLET (1 MG) BY MOUTH ONCE DAILY., Disp: , Rfl: 3     apremilast (OTEZLA) 30 MG tablet, TAKE 1 TABLET (30 MG) TWICE A DAY., Disp: 60 tablet, Rfl: 5     calcipotriene (DOVONOX) 0.005 % external ointment, Apply topically 2 times daily with  triamcinolone, Disp: 120 g, Rfl: 11     calcium carbonate (OS-CHUY 600 MG [place]. [place]) 600 MG tablet, Take 1 tablet by mouth, Disp: , Rfl:      Cholecalciferol (VITAMIN D3) 1000 units CAPS, , Disp: , Rfl:      fluticasone-salmeterol (ADVAIR DISKUS) 250-50 MCG/DOSE inhaler, Inhale 1 puff into the lungs, Disp: , Rfl:      hydrocortisone 2.5 % ointment, Use daily to rash on face, ears, underarms, or groin, Disp: 60 g, Rfl: 3     triamcinolone (KENALOG) 0.1 % external ointment, Use at night to rash on body, Disp: 454 g, Rfl: 3     venlafaxine (EFFEXOR-XR) 150 MG 24 hr capsule, Take 300 mg by mouth, Disp: , Rfl:      Zoledronic Acid (RECLAST IV), , Disp: , Rfl:      IRON PO, Take 1 tablet by mouth, Disp: , Rfl:      triamcinolone (KENALOG) 0.1 % external cream, APPLY 1 APPLICATION TOPICALLY TO AFFECTED SKIN AREA(S) TWICE A DAY., Disp: , Rfl: 1     Vitamin D, Cholecalciferol, 1000 UNITS CAPS, , Disp: , Rfl:       Allergies:   Patient has no known allergies.      Past Medical History:   - Psoriatic arthritis: age of onset 1994 with psoriasis, positive SORIN 1:320, -RF and - CCP, started Enbrel 2012, - stopped for lack of efficacy. 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 Hsu this Nov is 2 years. On arimidex for 10 yr will be seeing oncologist soon   - Irritable bowel  - Depression  - Asthma   - Osteopenia (4-2018 T-1.2 monitored per oncologist)   - PTSD  - Vitamin D deficiency  - History elevated liver tests 2-2018  AST 91  - Chicken pox    Past Surgical History:  Breast cancer surgery lumpectomy then lymph nodes removed 2 weeks later, HYST 2013    Family History:   No autoimmune disorders, psoriasis, UC, crohn's, SLE, RA, PsA, gout.  No MS in  "family  Mother-healthy, HTN, thyroid, shingles    Father-passed colon cancer  Siblings-1 brother healthy-shingles   Children-1 dtr healthy  Father's side \"all  from cancer\" pancreatic cancer PGM; leukemia -PGF    Social History:   Rare Alcohol. Never Smoking. No use of smokeless tobacco. No IVDU. 1Children. Right handed. . 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 No mucous membrane lesions, normal saliva pool  Neck: No mass or thyroid enlargement  Resp: Lungs clear to auscultation, nl to palpation  CV: RRR, no murmurs, rubs or gallops, no edema  GI: No ABD mass or tenderness, no HSM  : Not tested  Lymph: No cervical, supraclavicular, inguinal or epitrochlear nodes  MS:No synovitis at wrists or elbows. Pain with full external rotation of the left shoulder but abduction is full. There is impingement on the left. Some crepitus with right knee flexion. Hip motion is excellent. The right knee lacks about 5  of extension. Lateral joint line tenderness. Drawer sign is negative. No ankle, mid-foot, or MTP tenderness on the right. Full left knee range of motion. Tenderness at the calcaneous on both sides. Mild bunion change on the left more so than the right.   Skin: Some differential vascular perfusion of the right 4th fingertip. There are a few pits on fingernail and a little onycholysis on the right 4th fingernail. There is psoriasis over both extensor forearms. Mild onycholysis of the large toenails.   Neuro: Normal cranial " nerves, strength, sensation, DTRs.   Psych: Normal judgement, orientation, memory, affect.     Laboratory:   RHEUM RESULTS Latest Ref Rng & Units 9/20/2017 11/20/2017   SED RATE 0 - 30 mm/h 6 -   CRP, INFLAMMATION 0.0 - 8.0 mg/L <2.9 -   RHEUMATOID FACTOR <20 IU/mL <20 -   AST 0 - 45 U/L 28 21   ALT 0 - 50 U/L 34 33   ALBUMIN 3.4 - 5.0 g/dL 3.8 3.3(L)   WBC 4.0 - 11.0 10e9/L 5.1 3.7(L)   RBC 3.8 - 5.2 10e12/L 4.12 4.08   HGB 11.7 - 15.7 g/dL 13.3 13.3   HCT 35.0 - 47.0 % 39.4 39.7   MCV 78 - 100 fl 96 97   MCHC 31.5 - 36.5 g/dL 33.8 33.5   RDW 10.0 - 15.0 % 12.8 13.2    - 450 10e9/L 204 195   CREATININE 0.52 - 1.04 mg/dL 0.60 -   GFR ESTIMATE, IF BLACK >60 mL/min/1.7m2 >90 -   GFR ESTIMATE >60 mL/min/1.7m2 >90 -   HEPATITIS C ANTIBODY NR:Nonreactive Nonreactive -       Rheumatoid Factor   Date Value Ref Range Status   09/20/2017 <20 <20 IU/mL Final     Cyclic Citrullinated Peptide Antibody, IgG   Date Value Ref Range Status   09/20/2017 1 <7 U/mL Final     Comment:     Negative     SORIN interpretation   Date Value Ref Range Status   09/20/2017 Positive (A) NEG^Negative Final     Comment:                                        Reference range:  <1:40  NEGATIVE  1:40 - 1:80  BORDERLINE POSITIVE  >1:80 POSITIVE       SORIN pattern 1   Date Value Ref Range Status   09/20/2017 HOMOGENEOUS  Final     SORIN titer 1   Date Value Ref Range Status   09/20/2017 1:320  Final     Hepatitis B Core Lucia   Date Value Ref Range Status   09/20/2017 Nonreactive NR^Nonreactive Final     Hep B Surface Agn   Date Value Ref Range Status   09/20/2017 Nonreactive NR^Nonreactive Final     Scribe Disclosure:  Angelic GUIDRY, am serving as a scribe to document services personally performed by Irwin Rivers MD at this visit, based upon the provider's statements to me. All documentation has been reviewed by the aforementioned provider prior to being entered into the official medical record.    Again, thank you for allowing me to  participate in the care of your patient.      Sincerely,    Irwin Rivers MD

## 2020-02-28 NOTE — PROGRESS NOTES
[facility]  Rheumatology Clinic  Irwin Rivers MD  2020     Name: Gricel Agarwal  MRN: [id number]  Age: 54 year old  : 1965  Referring provider: Tejal Rivers     Assessment and Plan:  # Psoriatic arthritis (less than 10% body surface area psoriasis, young adult age of onset; nail and scalp involvement; oligoarthritis; no axial symptoms):  Patient relates significantly improved overall control of psoriasis since starting apremilast in May 2019. Left shoulder and right knee pain persist. Exam shows crepitus at the right knee with tenderness at the lateral joint line. There is impingement at the left shoulder. Blood work from 2020 showed that electrolytes, liver function, and creatinine were all normal. Hand and foot x-rays from 2017 showed no erosions.     I think that psoriatic arthritis is significantly improved since May 2019. R knee pain likely reflects mechanical symptoms from internal derangement of ligaments or menisci. L shoulder pain reflects infraspinatus tendonitis. I recommend continuing apremilast 30 mg twice daily. She should have CBC, creatinine, and LFTs approximately every 6 months while on apremilast. I agree with Lee Ross's assessment that she should not receive TNF inhibitors due to history of breast cancer; she is not a candidate for methotrexate due to history of leukopenia. I recommend that she continue calcipotriene and triamcinolone creams per Dr. Raphael for complementary management of psoriasis.     # Right knee pain:   Query synovitis vs internal derangement. I agree with primary provider Silvestre's recommendation to obtain MRI, in view of possible soft tissue involvement, including meniscal tear vs ligamentous injury.     # Rotator cuff tendonitis, left:   There is no evidence of shoulder range of motion defect. I recommend continuing vigorous physical activity to maintain range of motion.     Follow-up: Return in about 6 months (around 2020).     HPI:    Gricel Agarwal is a 54 year old female with a history of psoriatic arthritis who presents for transfer of care. 9-2017 +SORIN 1:320 homogenous, -RF -CCP -HIV -Hep/c -MTB QG w/no erosions on hand or feet XR (except bilateral hallux valgus 1st MTPs degenerative changes) . History breast cancer s/p radiation Started Anastrazole April 2016. She last saw Lee Ross on 02/18/2019. Psoriatic arthritis was active. She developed leukopenia a week after starting methotrexate. Plan was to start sulfasalazine 500 mg BID and if well tolerated slowly increase to 1 gram BID.    Past-Enbrel 5 yrs ago 2012 so stopped not help with scalp psoriasis, prior topical steroids. Methotrexate 5 mg (per oncology who felt she may try) but dropped WBC (leukopenia after 1 dose) 3.7 so this was stopped --history elevated liver tests 2-2018  AST 91 (was not on methotrexate at this time). Sulfasalazine was proposed in 3-19, but never started, because oncology recommended Otezla instead. Otezla was begun in 04/2019.     Today she reports that skin has been clear with use of Otezla and daily topicals. She most often uses triamcinolone. She reports that her skin has never been this clear. However, she feels like her joint pain is getting worse. She has right knee pain with reduced straightening of the right knee. Pain is constant and not worse with standing for prolonged periods of time. Pain is worse however with prolonged rest. Stiffness last for 30-45 minutes. The knee does occasionally feel like it might give out. Going up the stairs is more painful than going down. She does have swelling and warmth in that knee. She has had pain in the right knee for 6-7 months. She has also had left shoulder pain, which is overall improved with physical therapy. She also has pain at the bases of the thumbs. She has numbness in the right arm at night. She has low back pain. She gets Reclast yearly and has an upcoming DEXA scan. Low back pain has improved  with shoe inserts. She occasionally has tingling and sudden shocking sensation in her left heel.    Patient saw Dr. Raphael in 05/2019 in follow up of psoriasis. Improvement in both skin and joint disease was noted and apremilast was recommended, in additional to triamcinolone and calcipotriene topical treatments.     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 She was a  in the past. She played volleyball and softball when she was younger.     Review of Systems:   Pertinent items are noted in HPI or as below, remainder of complete ROS is negative.      No recent problems with hearing or vision. No swallowing problems.   No breathing difficulty, shortness of breath, coughing, or wheezing  No chest pain or palpitations  No heart burn, indigestion, abdominal pain, nausea, vomiting, diarrhea  No urination problems, no bloody, cloudy urine, no dysuria  No weakness  No headaches or confusion  No easy bleeding or bruising.   + psoriasis   + left shoulder pain   + right knee pain   + heel tingling   + right arm numbness   + low back pain     Active Medications:     Current Outpatient Medications:      albuterol (VENTOLIN HFA) 108 (90 Base) MCG/ACT inhaler, Inhale 2 puffs into the lungs, Disp: , Rfl:      ALPRAZolam (XANAX) 1 MG tablet, Take 1 tab by mouth 3 times per day as needed for anxiety or sleep, Disp: , Rfl:      anastrozole (ARIMIDEX) 1 MG tablet, TAKE 1 TABLET (1 MG) BY MOUTH ONCE DAILY., Disp: , Rfl: 3     apremilast (OTEZLA) 30 MG tablet, TAKE 1 TABLET (30 MG) TWICE A DAY., Disp: 60 tablet, Rfl: 5     calcipotriene (DOVONOX) 0.005 % external ointment, Apply topically 2 times daily with triamcinolone, Disp: 120 g, Rfl: 11     calcium carbonate (OS-CHUY 600 MG [place]. [place]) 600 MG tablet, Take 1 tablet by mouth, Disp: , Rfl:      Cholecalciferol (VITAMIN D3) 1000 units CAPS, , Disp: , Rfl:      fluticasone-salmeterol (ADVAIR DISKUS) 250-50 MCG/DOSE inhaler, Inhale 1 puff  "into the lungs, Disp: , Rfl:      hydrocortisone 2.5 % ointment, Use daily to rash on face, ears, underarms, or groin, Disp: 60 g, Rfl: 3     triamcinolone (KENALOG) 0.1 % external ointment, Use at night to rash on body, Disp: 454 g, Rfl: 3     venlafaxine (EFFEXOR-XR) 150 MG 24 hr capsule, Take 300 mg by mouth, Disp: , Rfl:      Zoledronic Acid (RECLAST IV), , Disp: , Rfl:      IRON PO, Take 1 tablet by mouth, Disp: , Rfl:      triamcinolone (KENALOG) 0.1 % external cream, APPLY 1 APPLICATION TOPICALLY TO AFFECTED SKIN AREA(S) TWICE A DAY., Disp: , Rfl: 1     Vitamin D, Cholecalciferol, 1000 UNITS CAPS, , Disp: , Rfl:       Allergies:   Patient has no known allergies.      Past Medical History:   - Psoriatic arthritis: age of onset  with psoriasis, positive SORIN 1:320, -RF and - CCP, started Enbrel , - stopped for lack of efficacy. 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 Hsu this Nov is 2 years. On arimidex for 10 yr will be seeing oncologist soon   - Irritable bowel  - Depression  - Asthma   - Osteopenia ( T-1.2 monitored per oncologist)   - PTSD  - Vitamin D deficiency  - History elevated liver tests   AST 91  - Chicken pox    Past Surgical History:  Breast cancer surgery lumpectomy then lymph nodes removed 2 weeks later, HYST     Family History:   No autoimmune disorders, psoriasis, UC, crohn's, SLE, RA, PsA, gout.  No MS in family  Mother-healthy, HTN, thyroid, shingles    Father-passed colon cancer  Siblings-1 brother healthy-shingles   Children-1 dtr healthy  Father's side \"all  from cancer\" pancreatic cancer PGM; leukemia -PGF    Social History:   Rare Alcohol. Never Smoking. No use of smokeless tobacco. No " "HOLLY. 1Children. Right handed. . Works at salon and cleans houses      Physical Exam:   /80   Pulse 58   Temp 98.2  F (36.8  C) (Oral)   Ht 1.753 m (5' 9\")   Wt 91.4 kg (201 lb 9.6 oz)   SpO2 98%   BMI 29.77 kg/m     Wt Readings from Last 4 Encounters:   02/28/20 91.4 kg (201 lb 9.6 oz)   02/18/19 94.8 kg (209 lb 1.6 oz)   09/20/17 88.3 kg (194 lb 9.6 oz)     Constitutional: Well-developed, appearing stated age; cooperative  Eyes: Normal EOM, PERRLA, vision, conjunctiva, sclera  ENT: Normal external ears, nose, hearing, lips, teeth, gums, throat. No mucous membrane lesions, normal saliva pool  Neck: No mass or thyroid enlargement  Resp: Lungs clear to auscultation, nl to palpation  CV: RRR, no murmurs, rubs or gallops, no edema  GI: No ABD mass or tenderness, no HSM  : Not tested  Lymph: No cervical, supraclavicular, inguinal or epitrochlear nodes  MS:No synovitis at wrists or elbows. Pain with full external rotation of the left shoulder but abduction is full. There is impingement on the left. Some crepitus with right knee flexion. Hip motion is excellent. The right knee lacks about 5  of extension. Lateral joint line tenderness. Drawer sign is negative. No ankle, mid-foot, or MTP tenderness on the right. Full left knee range of motion. Tenderness at the calcaneous on both sides. Mild bunion change on the left more so than the right.   Skin: Some differential vascular perfusion of the right 4th fingertip. There are a few pits on fingernail and a little onycholysis on the right 4th fingernail. There is psoriasis over both extensor forearms. Mild onycholysis of the large toenails.   Neuro: Normal cranial nerves, strength, sensation, DTRs.   Psych: Normal judgement, orientation, memory, affect.     Laboratory:   RHEUM RESULTS Latest Ref Rng & Units 9/20/2017 11/20/2017   SED RATE 0 - 30 mm/h 6 -   CRP, INFLAMMATION 0.0 - 8.0 mg/L <2.9 -   RHEUMATOID FACTOR <20 IU/mL <20 -   AST 0 - 45 U/L 28 21 "   ALT 0 - 50 U/L 34 33   ALBUMIN 3.4 - 5.0 g/dL 3.8 3.3(L)   WBC 4.0 - 11.0 10e9/L 5.1 3.7(L)   RBC 3.8 - 5.2 10e12/L 4.12 4.08   HGB 11.7 - 15.7 g/dL 13.3 13.3   HCT 35.0 - 47.0 % 39.4 39.7   MCV 78 - 100 fl 96 97   MCHC 31.5 - 36.5 g/dL 33.8 33.5   RDW 10.0 - 15.0 % 12.8 13.2    - 450 10e9/L 204 195   CREATININE 0.52 - 1.04 mg/dL 0.60 -   GFR ESTIMATE, IF BLACK >60 mL/min/1.7m2 >90 -   GFR ESTIMATE >60 mL/min/1.7m2 >90 -   HEPATITIS C ANTIBODY NR:Nonreactive Nonreactive -       Rheumatoid Factor   Date Value Ref Range Status   09/20/2017 <20 <20 IU/mL Final     Cyclic Citrullinated Peptide Antibody, IgG   Date Value Ref Range Status   09/20/2017 1 <7 U/mL Final     Comment:     Negative     SORIN interpretation   Date Value Ref Range Status   09/20/2017 Positive (A) NEG^Negative Final     Comment:                                        Reference range:  <1:40  NEGATIVE  1:40 - 1:80  BORDERLINE POSITIVE  >1:80 POSITIVE       SORIN pattern 1   Date Value Ref Range Status   09/20/2017 HOMOGENEOUS  Final     SORIN titer 1   Date Value Ref Range Status   09/20/2017 1:320  Final     Hepatitis B Core Lucia   Date Value Ref Range Status   09/20/2017 Nonreactive NR^Nonreactive Final     Hep B Surface Agn   Date Value Ref Range Status   09/20/2017 Nonreactive NR^Nonreactive Final     Scribe Disclosure:  Angelic GUIDRY, am serving as a scribe to document services personally performed by Irwin Rivers MD at this visit, based upon the provider's statements to me. All documentation has been reviewed by the aforementioned provider prior to being entered into the official medical record.

## 2020-02-28 NOTE — LETTER
2020      RE: Gricel Agarwal  [address] [facility] [address]       [facility]  Rheumatology Clinic  Irwin Rivers MD  2020     Name: Gricel Agarwal  MRN: [id number]  Age: 54 year old  : 1965  Referring provider: Tejal Rivers     Assessment and Plan:  # Psoriatic arthritis (less than 10% body surface area psoriasis, young adult age of onset; nail and scalp involvement; oligoarthritis; no axial symptoms):  Patient relates significantly improved overall control of psoriasis since starting apremilast in May 2019. Left shoulder and right knee pain persist. Exam shows crepitus at the right knee with tenderness at the lateral joint line. There is impingement at the left shoulder. Blood work from 2020 showed that electrolytes, liver function, and creatinine were all normal. Hand and foot x-rays from  showed no erosions.     I think that psoriatic arthritis is significantly improved since May 2019. R knee pain likely reflects mechanical symptoms from internal derangement of ligaments or menisci. L shoulder pain reflects infraspinatus tendonitis. I recommend continuing apremilast 30 mg twice daily. She should have CBC, creatinine, and LFTs approximately every 6 months while on apremilast. I agree with Lee Ross's assessment that she should not receive TNF inhibitors due to history of breast cancer; she is not a candidate for methotrexate due to history of leukopenia. I recommend that she continue calcipotriene and triamcinolone creams per Dr. Raphael for complementary management of psoriasis.     # Right knee pain:   Query synovitis vs internal derangement. I agree with primary provider Silvestre's recommendation to obtain MRI, in view of possible soft tissue involvement, including meniscal tear vs ligamentous injury.     # Rotator cuff tendonitis, left:   There is no evidence of shoulder range of motion defect. I recommend continuing vigorous physical activity to maintain range of  motion.     Follow-up: Return in about 6 months (around 8/28/2020).     HPI:   Gricel Agarwal is a 54 year old female with a history of psoriatic arthritis who presents for transfer of care. 9-2017 +SORIN 1:320 homogenous, -RF -CCP -HIV -Hep/c -MTB QG w/no erosions on hand or feet XR (except bilateral hallux valgus 1st MTPs degenerative changes) . History breast cancer s/p radiation Started Anastrazole April 2016. She last saw Lee Ross on 02/18/2019. Psoriatic arthritis was active. She developed leukopenia a week after starting methotrexate. Plan was to start sulfasalazine 500 mg BID and if well tolerated slowly increase to 1 gram BID.    Past-Enbrel 5 yrs ago 2012 so stopped not help with scalp psoriasis, prior topical steroids. Methotrexate 5 mg (per oncology who felt she may try) but dropped WBC (leukopenia after 1 dose) 3.7 so this was stopped --history elevated liver tests 2-2018  AST 91 (was not on methotrexate at this time). Sulfasalazine was proposed in 3-19, but never started, because oncology recommended Otezla instead. Otezla was begun in 04/2019.     Today she reports that skin has been clear with use of Otezla and daily topicals. She most often uses triamcinolone. She reports that her skin has never been this clear. However, she feels like her joint pain is getting worse. She has right knee pain with reduced straightening of the right knee. Pain is constant and not worse with standing for prolonged periods of time. Pain is worse however with prolonged rest. Stiffness last for 30-45 minutes. The knee does occasionally feel like it might give out. Going up the stairs is more painful than going down. She does have swelling and warmth in that knee. She has had pain in the right knee for 6-7 months. She has also had left shoulder pain, which is overall improved with physical therapy. She also has pain at the bases of the thumbs. She has numbness in the right arm at night. She has low back pain. She  gets Reclast yearly and has an upcoming DEXA scan. Low back pain has improved with shoe inserts. She occasionally has tingling and sudden shocking sensation in her left heel.    Patient saw Dr. Raphael in 05/2019 in follow up of psoriasis. Improvement in both skin and joint disease was noted and apremilast was recommended, in additional to triamcinolone and calcipotriene topical treatments.     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 She was a  in the past. She played volleyball and softball when she was younger.     Review of Systems:   Pertinent items are noted in HPI or as below, remainder of complete ROS is negative.      No recent problems with hearing or vision. No swallowing problems.   No breathing difficulty, shortness of breath, coughing, or wheezing  No chest pain or palpitations  No heart burn, indigestion, abdominal pain, nausea, vomiting, diarrhea  No urination problems, no bloody, cloudy urine, no dysuria  No weakness  No headaches or confusion  No easy bleeding or bruising.   + psoriasis   + left shoulder pain   + right knee pain   + heel tingling   + right arm numbness   + low back pain     Active Medications:     Current Outpatient Medications:      albuterol (VENTOLIN HFA) 108 (90 Base) MCG/ACT inhaler, Inhale 2 puffs into the lungs, Disp: , Rfl:      ALPRAZolam (XANAX) 1 MG tablet, Take 1 tab by mouth 3 times per day as needed for anxiety or sleep, Disp: , Rfl:      anastrozole (ARIMIDEX) 1 MG tablet, TAKE 1 TABLET (1 MG) BY MOUTH ONCE DAILY., Disp: , Rfl: 3     apremilast (OTEZLA) 30 MG tablet, TAKE 1 TABLET (30 MG) TWICE A DAY., Disp: 60 tablet, Rfl: 5     calcipotriene (DOVONOX) 0.005 % external ointment, Apply topically 2 times daily with triamcinolone, Disp: 120 g, Rfl: 11     calcium carbonate (OS-CHUY 600 MG [place]. [place]) 600 MG tablet, Take 1 tablet by mouth, Disp: , Rfl:      Cholecalciferol (VITAMIN D3) 1000 units CAPS, , Disp: , Rfl:       "fluticasone-salmeterol (ADVAIR DISKUS) 250-50 MCG/DOSE inhaler, Inhale 1 puff into the lungs, Disp: , Rfl:      hydrocortisone 2.5 % ointment, Use daily to rash on face, ears, underarms, or groin, Disp: 60 g, Rfl: 3     triamcinolone (KENALOG) 0.1 % external ointment, Use at night to rash on body, Disp: 454 g, Rfl: 3     venlafaxine (EFFEXOR-XR) 150 MG 24 hr capsule, Take 300 mg by mouth, Disp: , Rfl:      Zoledronic Acid (RECLAST IV), , Disp: , Rfl:      IRON PO, Take 1 tablet by mouth, Disp: , Rfl:      triamcinolone (KENALOG) 0.1 % external cream, APPLY 1 APPLICATION TOPICALLY TO AFFECTED SKIN AREA(S) TWICE A DAY., Disp: , Rfl: 1     Vitamin D, Cholecalciferol, 1000 UNITS CAPS, , Disp: , Rfl:       Allergies:   Patient has no known allergies.      Past Medical History:   - Psoriatic arthritis: age of onset  with psoriasis, positive SORIN 1:320, -RF and - CCP, started Enbrel , - stopped for lack of efficacy. 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 Hsu this Nov is 2 years. On arimidex for 10 yr will be seeing oncologist soon   - Irritable bowel  - Depression  - Asthma   - Osteopenia ( T-1.2 monitored per oncologist)   - PTSD  - Vitamin D deficiency  - History elevated liver tests   AST 91  - Chicken pox    Past Surgical History:  Breast cancer surgery lumpectomy then lymph nodes removed 2 weeks later, HYST     Family History:   No autoimmune disorders, psoriasis, UC, crohn's, SLE, RA, PsA, gout.  No MS in family  Mother-healthy, HTN, thyroid, shingles    Father-passed colon cancer  Siblings-1 brother healthy-shingles   Children-1 dtr healthy  Father's side \"all  from cancer\" pancreatic cancer PGM; leukemia " "-PGF    Social History:   Rare Alcohol. Never Smoking. No use of smokeless tobacco. No IVDU. 1Children. Right handed. . Works at salon and cleans houses      Physical Exam:   /80   Pulse 58   Temp 98.2  F (36.8  C) (Oral)   Ht 1.753 m (5' 9\")   Wt 91.4 kg (201 lb 9.6 oz)   SpO2 98%   BMI 29.77 kg/m      Wt Readings from Last 4 Encounters:   02/28/20 91.4 kg (201 lb 9.6 oz)   02/18/19 94.8 kg (209 lb 1.6 oz)   09/20/17 88.3 kg (194 lb 9.6 oz)     Constitutional: Well-developed, appearing stated age; cooperative  Eyes: Normal EOM, PERRLA, vision, conjunctiva, sclera  ENT: Normal external ears, nose, hearing, lips, teeth, gums, throat. No mucous membrane lesions, normal saliva pool  Neck: No mass or thyroid enlargement  Resp: Lungs clear to auscultation, nl to palpation  CV: RRR, no murmurs, rubs or gallops, no edema  GI: No ABD mass or tenderness, no HSM  : Not tested  Lymph: No cervical, supraclavicular, inguinal or epitrochlear nodes  MS:No synovitis at wrists or elbows. Pain with full external rotation of the left shoulder but abduction is full. There is impingement on the left. Some crepitus with right knee flexion. Hip motion is excellent. The right knee lacks about 5  of extension. Lateral joint line tenderness. Drawer sign is negative. No ankle, mid-foot, or MTP tenderness on the right. Full left knee range of motion. Tenderness at the calcaneous on both sides. Mild bunion change on the left more so than the right.   Skin: Some differential vascular perfusion of the right 4th fingertip. There are a few pits on fingernail and a little onycholysis on the right 4th fingernail. There is psoriasis over both extensor forearms. Mild onycholysis of the large toenails.   Neuro: Normal cranial nerves, strength, sensation, DTRs.   Psych: Normal judgement, orientation, memory, affect.     Laboratory:   RHEUM RESULTS Latest Ref Rng & Units 9/20/2017 11/20/2017   SED RATE 0 - 30 mm/h 6 -   CRP, " INFLAMMATION 0.0 - 8.0 mg/L <2.9 -   RHEUMATOID FACTOR <20 IU/mL <20 -   AST 0 - 45 U/L 28 21   ALT 0 - 50 U/L 34 33   ALBUMIN 3.4 - 5.0 g/dL 3.8 3.3(L)   WBC 4.0 - 11.0 10e9/L 5.1 3.7(L)   RBC 3.8 - 5.2 10e12/L 4.12 4.08   HGB 11.7 - 15.7 g/dL 13.3 13.3   HCT 35.0 - 47.0 % 39.4 39.7   MCV 78 - 100 fl 96 97   MCHC 31.5 - 36.5 g/dL 33.8 33.5   RDW 10.0 - 15.0 % 12.8 13.2    - 450 10e9/L 204 195   CREATININE 0.52 - 1.04 mg/dL 0.60 -   GFR ESTIMATE, IF BLACK >60 mL/min/1.7m2 >90 -   GFR ESTIMATE >60 mL/min/1.7m2 >90 -   HEPATITIS C ANTIBODY NR:Nonreactive Nonreactive -       Rheumatoid Factor   Date Value Ref Range Status   09/20/2017 <20 <20 IU/mL Final     Cyclic Citrullinated Peptide Antibody, IgG   Date Value Ref Range Status   09/20/2017 1 <7 U/mL Final     Comment:     Negative     SORIN interpretation   Date Value Ref Range Status   09/20/2017 Positive (A) NEG^Negative Final     Comment:                                        Reference range:  <1:40  NEGATIVE  1:40 - 1:80  BORDERLINE POSITIVE  >1:80 POSITIVE       SROIN pattern 1   Date Value Ref Range Status   09/20/2017 HOMOGENEOUS  Final     SORIN titer 1   Date Value Ref Range Status   09/20/2017 1:320  Final     Hepatitis B Core Lucia   Date Value Ref Range Status   09/20/2017 Nonreactive NR^Nonreactive Final     Hep B Surface Agn   Date Value Ref Range Status   09/20/2017 Nonreactive NR^Nonreactive Final     Scribe Disclosure:  IAngelic, am serving as a scribe to document services personally performed by Irwin Rivers MD at this visit, based upon the provider's statements to me. All documentation has been reviewed by the aforementioned provider prior to being entered into the official medical record.    Irwin Rivers MD

## 2020-03-01 DIAGNOSIS — L40.9 PSORIASIS: ICD-10-CM

## 2020-03-01 DIAGNOSIS — L40.50 PSORIATIC ARTHRITIS (H): ICD-10-CM

## 2020-03-03 RX ORDER — APREMILAST 30 MG/1
TABLET, FILM COATED ORAL
Qty: 60 TABLET | Refills: 12 | OUTPATIENT
Start: 2020-03-03

## 2020-03-14 ENCOUNTER — ANCILLARY PROCEDURE (OUTPATIENT)
Dept: MRI IMAGING | Facility: CLINIC | Age: 55
End: 2020-03-14
Attending: INTERNAL MEDICINE
Payer: COMMERCIAL

## 2020-03-14 DIAGNOSIS — L40.50 PSORIATIC ARTHRITIS (H): ICD-10-CM

## 2020-06-02 RX ORDER — ANASTROZOLE 1 MG/1
TABLET ORAL
Qty: 90 TABLET | Refills: 3 | OUTPATIENT
Start: 2020-06-02

## 2020-09-09 DIAGNOSIS — L40.50 PSORIATIC ARTHRITIS (H): ICD-10-CM

## 2020-09-09 DIAGNOSIS — L40.9 PSORIASIS: ICD-10-CM

## 2020-09-12 NOTE — TELEPHONE ENCOUNTER
apremilast (OTEZLA) 30 MG tablet    Last Written Prescription Date:  2/28/2020  Last Fill Quantity: 60,   # refills: 5  Last Office Visit : 2/28/2020  Future Office visit:  None    Routing refill request to provider for review/approval because:  Pt asked to follow up in 6 months in last visit from Feb 2020.  No follow up visit noted.  Would provider like Pt to continue same dose?   Follow up with Pt?   Please advise      Lazara Marquis RN  Central Triage Red Flags/Med Refills

## 2020-09-14 RX ORDER — APREMILAST 30 MG/1
TABLET, FILM COATED ORAL
Qty: 60 TABLET | Refills: 1 | Status: SHIPPED | OUTPATIENT
Start: 2020-09-14 | End: 2020-12-15

## 2020-12-12 DIAGNOSIS — L40.9 PSORIASIS: ICD-10-CM

## 2020-12-12 DIAGNOSIS — L40.50 PSORIATIC ARTHRITIS (H): ICD-10-CM

## 2020-12-15 NOTE — TELEPHONE ENCOUNTER
apremilast (OTEZLA) 30 MG tablet   TAKE 1 TABLET (30 MG) TWICE A DAY     Last Written Prescription Date:  9/14/20  Last Fill Quantity: 60,   # refills: 1  Last Office Visit : 2/28/20  Plan:   - Obtain right knee MRI   - Continue Otezla 30 mg twice daily   - Continue triamcinolone and calcipotriene per Dr. Raphael   - Repeat labs (creatinine, liver function tests, and blood counts) every 6 months (ok to follow Oncology labs for this purpose)     Future Office visit:  none    Routing refill request to provider for review/approval because:  Overdue appointment .  Pt asked to follow up in 6 months in last visit from Feb 2020.  Repeat labs. No follow up visit noted or labs.  Would provider like Pt to continue same dose?   Follow up with Pt?   Please advise.

## 2020-12-16 RX ORDER — APREMILAST 30 MG/1
TABLET, FILM COATED ORAL
Qty: 60 TABLET | Refills: 3 | Status: SHIPPED | OUTPATIENT
Start: 2020-12-16 | End: 2023-11-06

## 2021-01-15 ENCOUNTER — HEALTH MAINTENANCE LETTER (OUTPATIENT)
Age: 56
End: 2021-01-15

## 2021-03-12 NOTE — TELEPHONE ENCOUNTER
Prior Authorization Approval    Authorization Effective Date: 9/2/2019  Authorization Expiration Date: 10/1/2022  Medication: OTEZLA-APPROVED  Approved Dose/Quantity:    Reference #: [id number]   Insurance Company: [employer] - Phone [phone] Fax [phone]  Expected CoPay:       CoPay Card Available:      Foundation Assistance Needed:    Which Pharmacy is filling the prescription (Not needed for infusion/clinic administered): [facility]  Pharmacy Notified: Yes  Patient Notified: Yes  **Instructed pharmacy to notify patient when script is ready to /ship.**         oral

## 2021-05-15 ENCOUNTER — HEALTH MAINTENANCE LETTER (OUTPATIENT)
Age: 56
End: 2021-05-15

## 2021-09-04 ENCOUNTER — HEALTH MAINTENANCE LETTER (OUTPATIENT)
Age: 56
End: 2021-09-04

## 2022-02-19 ENCOUNTER — HEALTH MAINTENANCE LETTER (OUTPATIENT)
Age: 57
End: 2022-02-19

## 2022-10-22 ENCOUNTER — HEALTH MAINTENANCE LETTER (OUTPATIENT)
Age: 57
End: 2022-10-22

## 2022-12-28 ENCOUNTER — APPOINTMENT (OUTPATIENT)
Dept: URBAN - METROPOLITAN AREA CLINIC 259 | Age: 57
Setting detail: DERMATOLOGY
End: 2022-12-29

## 2022-12-28 DIAGNOSIS — L40.0 PSORIASIS VULGARIS: ICD-10-CM

## 2022-12-28 PROCEDURE — 96372 THER/PROPH/DIAG INJ SC/IM: CPT

## 2022-12-28 PROCEDURE — OTHER TALTZ INITIATION: OTHER

## 2022-12-28 PROCEDURE — OTHER INTRAMUSCULAR KENALOG: OTHER

## 2022-12-28 PROCEDURE — OTHER ORDER TESTS: OTHER

## 2022-12-28 PROCEDURE — OTHER VENIPUNCTURE: OTHER

## 2022-12-28 PROCEDURE — OTHER COUNSELING: OTHER

## 2022-12-28 PROCEDURE — OTHER MIPS QUALITY: OTHER

## 2022-12-28 PROCEDURE — 36415 COLL VENOUS BLD VENIPUNCTURE: CPT

## 2022-12-28 PROCEDURE — OTHER SEPARATE AND IDENTIFIABLE DOCUMENTATION: OTHER

## 2022-12-28 PROCEDURE — 99204 OFFICE O/P NEW MOD 45 MIN: CPT | Mod: 25

## 2022-12-28 ASSESSMENT — BSA PSORIASIS: % BODY COVERED IN PSORIASIS: 50

## 2022-12-28 ASSESSMENT — LOCATION SIMPLE DESCRIPTION DERM
LOCATION SIMPLE: LEFT BUTTOCK
LOCATION SIMPLE: ABDOMEN

## 2022-12-28 ASSESSMENT — LOCATION ZONE DERM: LOCATION ZONE: TRUNK

## 2022-12-28 ASSESSMENT — LOCATION DETAILED DESCRIPTION DERM
LOCATION DETAILED: LEFT BUTTOCK
LOCATION DETAILED: PERIUMBILICAL SKIN

## 2022-12-28 NOTE — HPI: RASH (PSORIASIS)
Do You Have A Family History Of Psoriasis?: no
How Severe Is Your Psoriasis?: severe
Is This A New Presentation, Or A Follow-Up?: Follow Up Psoriasis
Additional History: Pt states she is miserable and can barely sleep at night. She is not currently using anything due to having breast cancer and then a severe work injury and just has not had a chance to get to the dermatologist,but states otezla was working the best when she was using it.

## 2022-12-28 NOTE — PROCEDURE: TALTZ INITIATION
Detail Level: Zone
Is Cyclosporine Contraindicated?: No
Taltz Dosing: 160mg SC x 1 at weeks 0 then 80mg SC at weeks 2, 4, 6, 8, 10 and 12 then 80mg SC every four weeks
Pregnancy And Lactation Warning Text: The risk during pregnancy and breastfeeding is uncertain with this medication.
Taltz Monitoring Guidelines: A yearly test for tuberculosis is required while taking Taltz.
Diagnosis (Required): Psoriasis

## 2022-12-29 ENCOUNTER — RX ONLY (RX ONLY)
Age: 57
End: 2022-12-29

## 2022-12-29 RX ORDER — TRIAMCINOLONE ACETONIDE 1 MG/G
CREAM TOPICAL
Qty: 454 | Refills: 0 | Status: ERX | COMMUNITY
Start: 2022-12-29

## 2023-02-21 ENCOUNTER — RX ONLY (RX ONLY)
Age: 58
End: 2023-02-21

## 2023-02-21 RX ORDER — IXEKIZUMAB 80 MG/ML
INJECTION, SOLUTION SUBCUTANEOUS
Qty: 1 | Refills: 6 | Status: ERX | COMMUNITY
Start: 2023-02-21

## 2023-04-01 ENCOUNTER — HEALTH MAINTENANCE LETTER (OUTPATIENT)
Age: 58
End: 2023-04-01

## 2023-06-01 ENCOUNTER — HEALTH MAINTENANCE LETTER (OUTPATIENT)
Age: 58
End: 2023-06-01

## 2023-10-31 ENCOUNTER — OFFICE VISIT (OUTPATIENT)
Dept: DERMATOLOGY | Facility: CLINIC | Age: 58
End: 2023-10-31
Payer: COMMERCIAL

## 2023-10-31 DIAGNOSIS — L82.1 SK (SEBORRHEIC KERATOSIS): ICD-10-CM

## 2023-10-31 DIAGNOSIS — L40.0 PSORIASIS VULGARIS: ICD-10-CM

## 2023-10-31 DIAGNOSIS — L40.9 PSORIASIS: ICD-10-CM

## 2023-10-31 DIAGNOSIS — L40.50 PSORIATIC ARTHRITIS (H): Primary | ICD-10-CM

## 2023-10-31 DIAGNOSIS — D22.9 MULTIPLE NEVI: ICD-10-CM

## 2023-10-31 PROCEDURE — 99204 OFFICE O/P NEW MOD 45 MIN: CPT | Performed by: DERMATOLOGY

## 2023-10-31 RX ORDER — LEVETIRACETAM 500 MG/1
500 TABLET ORAL 2 TIMES DAILY
COMMUNITY

## 2023-10-31 RX ORDER — TRIAMCINOLONE ACETONIDE 1 MG/G
OINTMENT TOPICAL
Qty: 454 G | Refills: 3 | Status: SHIPPED | OUTPATIENT
Start: 2023-10-31 | End: 2024-02-26

## 2023-10-31 RX ORDER — CALCIPOTRIENE 50 UG/G
OINTMENT TOPICAL 2 TIMES DAILY
Qty: 120 G | Refills: 11 | Status: SHIPPED | OUTPATIENT
Start: 2023-10-31

## 2023-10-31 RX ORDER — HYDROCORTISONE 25 MG/G
OINTMENT TOPICAL
Qty: 60 G | Refills: 3 | Status: SHIPPED | OUTPATIENT
Start: 2023-10-31

## 2023-10-31 ASSESSMENT — PAIN SCALES - GENERAL: PAINLEVEL: NO PAIN (0)

## 2023-10-31 NOTE — LETTER
10/31/2023       RE: Gricel Agarwal  [address] [facility] [address]     Dear Colleague,    Thank you for referring your patient, Gricel Agarwal, to the [facility] [place] at [facility]. Please see a copy of my visit note below.    [facility] D[facility]atology Note  Encounter Date: Oct 31, 2023  Office Visit     Dermatology Problem List:     1.Psoriasis and psoriatic arthritis.    --Current:                -Triamcinolone 0.1% ointment/calcipotriene ointment/ 2.5% hydrocortisone (refilled 10/31/23)   -Otezla per rheumatology stopped 05/2023 due to seizures (unsure whether or not seizures related to medication). Referred to rheum 10/31/2023.  --Past: Enbrel used back in 2012, methotrexate test dose 2018 with leukopenia.   --Consider future adjunctive nbUVB  --H/o breast cancer    ____________________________________________    Assessment & Plan:   # Psoriasis and psoriatic arthritis- worse since stopping medication   -Triamcinolone 0.1% ointment/calcipotriene ointment/ 2.5% hydrocortisone prn refills provided. Discussed Hydrocortisone 2.5% can be used on face and groin area.   -Otezla per rheumatology stopped 05/2023 due to seizures (unsure whether or not seizures related to medication). Given arthritis is causing significant discomfort and described as debilitating, suggest patient follow-up with rheumatology to discuss re-starting Otezla or other systemic therapies. Rheumatology referral sent 10/31/2023.  -Consider future adjunctive nbUVB    # Seborrheic keratoses    # benign nevi. No treatment is necessary at this time unless these lesions change or become symptomatic.     # Cherry angiomas    Procedures Performed:     None      Follow-up: 4 month(s) in-person, or earlier for new or changing lesions    Staff: Dr. Ijeoma Colindres MS3  Medical Student  I was present with the medical student who participated  "in the service and in the documentation of the note. I have verified the history and personally performed the physical exam and medical decision making. I agree with the assessment and plan of care as documented in the note.  Nicolle Raphael MD   ____________________________________________    CC: Derm Problem (Gricel is being seen today for Psoriasis. Pt reports having psoriasis for 35 years, Areas of concern in groin, legs, elbows. Was \"taking shots\" and it worked great but was having seizures so stopped. )    HPI:  Ms. Gricel Agarwal is a(n) 57 year old female who presents today for psoriasis and psoriatic arthritis.    Both psoriasis and arthritis were significantly improved since starting \"shots\" (unsure name). She was rarely experiencing flares and felt conditions were well controlled. However, she stopped use of all medications in May of 2023 after experiencing three grand-mal seizures. Unsure if seizures were related to medications, but she wanted to take a break and focus on one health issue at a time. Since terminating use of medications, she is experiencing more frequent flares.   Psoriasis currently flaring on elbows, legs, feet and groin area. Endorses pruritis and bleeding. Fairly uncomfortable, but can manage if there are no treatment options. Patient has not been using any topicals as she ran out of refills. Endorses moderate relief of itching and discomfort with use of creams.    Patient would also like a full-body skin check today. Has not noticed any new or changing moles.    Patient is otherwise feeling well, without additional skin concerns.    Labs Reviewed:  No new labs.    Physical Exam:  Vitals: There were no vitals taken for this visit.  SKIN: Full skin, which includes the head/face, both arms, chest, back, abdomen,both legs, genitalia and/or groin buttocks, digits and/or nails, was examined.  -Onycholysis and oil spots of the finger nails  -Scattered pink papules and plaques with scale " on the bilateral lower legs, thighs, lateral foot, groin area and the elbows. No scaling of the scalp  -Many 2-4mm brown macules and papules on trunk and extremities.  None with atypia on dermoscopy  -Several 1-2mm red papules on chest.   -No other lesions of concern on areas examined.     Medications:  Current Outpatient Medications   Medication    albuterol (VENTOLIN HFA) 108 (90 Base) MCG/ACT inhaler    calcipotriene (DOVONOX) 0.005 % external ointment    Cholecalciferol (VITAMIN D3) 1000 units CAPS    fluticasone-salmeterol (ADVAIR DISKUS) 250-50 MCG/DOSE inhaler    hydrocortisone 2.5 % ointment    levETIRAcetam (KEPPRA) 500 MG tablet    triamcinolone (KENALOG) 0.1 % external ointment    venlafaxine (EFFEXOR-XR) 150 MG 24 hr capsule    ALPRAZolam (XANAX) 1 MG tablet    anastrozole (ARIMIDEX) 1 MG tablet    apremilast (OTEZLA) 30 MG tablet    calcium carbonate (OS-CHUY 600 MG [place]. [place]) 600 MG tablet    IRON PO    triamcinolone (KENALOG) 0.1 % external cream    Vitamin D, Cholecalciferol, 1000 UNITS CAPS    Zoledronic Acid (RECLAST IV)     No current facility-administered medications for this visit.      Past Medical History:   Patient Active Problem List   Diagnosis   (none) - all problems resolved or deleted     History reviewed. No pertinent past medical history.    CC Referred MD Reese  No address on file on close of this encounter.

## 2023-10-31 NOTE — PROGRESS NOTES
[facility] Dermatology Note  Encounter Date: Oct 31, 2023  Office Visit     Dermatology Problem List:     1.Psoriasis and psoriatic arthritis.    --Current:                -Triamcinolone 0.1% ointment/calcipotriene ointment/ 2.5% hydrocortisone (refilled 10/31/23)   -Otezla per rheumatology stopped 05/2023 due to seizures (unsure whether or not seizures related to medication). Referred to rheum 10/31/2023.  --Past: Enbrel used back in 2012, methotrexate test dose 2018 with leukopenia.   --Consider future adjunctive nbUVB  --H/o breast cancer    ____________________________________________    Assessment & Plan:   # Psoriasis and psoriatic arthritis- worse since stopping medication   -Triamcinolone 0.1% ointment/calcipotriene ointment/ 2.5% hydrocortisone prn refills provided. Discussed Hydrocortisone 2.5% can be used on face and groin area.   -Otezla per rheumatology stopped 05/2023 due to seizures (unsure whether or not seizures related to medication). Given arthritis is causing significant discomfort and described as debilitating, suggest patient follow-up with rheumatology to discuss re-starting Otezla or other systemic therapies. Rheumatology referral sent 10/31/2023.  -Consider future adjunctive nbUVB    # Seborrheic keratoses    # benign nevi. No treatment is necessary at this time unless these lesions change or become symptomatic.     # Cherry angiomas    Procedures Performed:     None      Follow-up: 4 month(s) in-person, or earlier for new or changing lesions    Staff: Dr. Ijeoma Colindres MS3  Medical Student  I was present with the medical student who participated in the service and in the documentation of the note. I have verified the history and personally performed the physical exam and medical decision making. I agree with the assessment and plan of care as documented in the note.  Nicolle Raphael MD   ____________________________________________    CC: Derm Problem (Gricel  "is being seen today for Psoriasis. Pt reports having psoriasis for 35 years, Areas of concern in groin, legs, elbows. Was \"taking shots\" and it worked great but was having seizures so stopped. )    HPI:  Ms. Gricel Agarwal is a(n) 57 year old female who presents today for psoriasis and psoriatic arthritis.    Both psoriasis and arthritis were significantly improved since starting \"shots\" (unsure name). She was rarely experiencing flares and felt conditions were well controlled. However, she stopped use of all medications in May of 2023 after experiencing three grand-mal seizures. Unsure if seizures were related to medications, but she wanted to take a break and focus on one health issue at a time. Since terminating use of medications, she is experiencing more frequent flares.   Psoriasis currently flaring on elbows, legs, feet and groin area. Endorses pruritis and bleeding. Fairly uncomfortable, but can manage if there are no treatment options. Patient has not been using any topicals as she ran out of refills. Endorses moderate relief of itching and discomfort with use of creams.    Patient would also like a full-body skin check today. Has not noticed any new or changing moles.    Patient is otherwise feeling well, without additional skin concerns.    Labs Reviewed:  No new labs.    Physical Exam:  Vitals: There were no vitals taken for this visit.  SKIN: Full skin, which includes the head/face, both arms, chest, back, abdomen,both legs, genitalia and/or groin buttocks, digits and/or nails, was examined.  -Onycholysis and oil spots of the finger nails  -Scattered pink papules and plaques with scale on the bilateral lower legs, thighs, lateral foot, groin area and the elbows. No scaling of the scalp  -Many 2-4mm brown macules and papules on trunk and extremities.  None with atypia on dermoscopy  -Several 1-2mm red papules on chest.   -No other lesions of concern on areas examined.     Medications:  Current " Outpatient Medications   Medication    albuterol (VENTOLIN HFA) 108 (90 Base) MCG/ACT inhaler    calcipotriene (DOVONOX) 0.005 % external ointment    Cholecalciferol (VITAMIN D3) 1000 units CAPS    fluticasone-salmeterol (ADVAIR DISKUS) 250-50 MCG/DOSE inhaler    hydrocortisone 2.5 % ointment    levETIRAcetam (KEPPRA) 500 MG tablet    triamcinolone (KENALOG) 0.1 % external ointment    venlafaxine (EFFEXOR-XR) 150 MG 24 hr capsule    ALPRAZolam (XANAX) 1 MG tablet    anastrozole (ARIMIDEX) 1 MG tablet    apremilast (OTEZLA) 30 MG tablet    calcium carbonate (OS-CHUY 600 MG [place]. [place]) 600 MG tablet    IRON PO    triamcinolone (KENALOG) 0.1 % external cream    Vitamin D, Cholecalciferol, 1000 UNITS CAPS    Zoledronic Acid (RECLAST IV)     No current facility-administered medications for this visit.      Past Medical History:   Patient Active Problem List   Diagnosis   (none) - all problems resolved or deleted     History reviewed. No pertinent past medical history.    CC Referred MD Reese  No address on file on close of this encounter.

## 2023-10-31 NOTE — NURSING NOTE
"Dermatology Rooming Note    Gricel Agarwal's goals for this visit include:   Chief Complaint   Patient presents with    Derm Problem     Gricel is being seen today for Psoriasis. Pt reports having psoriasis for 35 years, Areas of concern in groin, legs, elbows. Was \"taking shots\" and it worked great but was having seizures so stopped.      Kathleen Goldman, EMT    "

## 2023-11-06 ENCOUNTER — LAB (OUTPATIENT)
Dept: LAB | Facility: CLINIC | Age: 58
End: 2023-11-06
Payer: COMMERCIAL

## 2023-11-06 ENCOUNTER — ANCILLARY PROCEDURE (OUTPATIENT)
Dept: GENERAL RADIOLOGY | Facility: CLINIC | Age: 58
End: 2023-11-06
Attending: INTERNAL MEDICINE
Payer: COMMERCIAL

## 2023-11-06 ENCOUNTER — OFFICE VISIT (OUTPATIENT)
Dept: RHEUMATOLOGY | Facility: CLINIC | Age: 58
End: 2023-11-06
Attending: INTERNAL MEDICINE
Payer: COMMERCIAL

## 2023-11-06 VITALS
BODY MASS INDEX: 27.62 KG/M2 | DIASTOLIC BLOOD PRESSURE: 76 MMHG | OXYGEN SATURATION: 100 % | WEIGHT: 187 LBS | HEART RATE: 75 BPM | SYSTOLIC BLOOD PRESSURE: 114 MMHG

## 2023-11-06 DIAGNOSIS — L40.9 PSORIASIS: ICD-10-CM

## 2023-11-06 DIAGNOSIS — G89.29 CHRONIC PAIN OF BOTH SHOULDERS: ICD-10-CM

## 2023-11-06 DIAGNOSIS — M25.512 CHRONIC PAIN OF BOTH SHOULDERS: ICD-10-CM

## 2023-11-06 DIAGNOSIS — Z87.898 HISTORY OF SEIZURE: ICD-10-CM

## 2023-11-06 DIAGNOSIS — L40.50 PSORIATIC ARTHRITIS (H): ICD-10-CM

## 2023-11-06 DIAGNOSIS — Z85.3 HISTORY OF BREAST CANCER: ICD-10-CM

## 2023-11-06 DIAGNOSIS — L40.50 PSORIATIC ARTHRITIS (H): Primary | ICD-10-CM

## 2023-11-06 DIAGNOSIS — M25.511 CHRONIC PAIN OF BOTH SHOULDERS: ICD-10-CM

## 2023-11-06 LAB
ALBUMIN SERPL BCG-MCNC: 4.4 G/DL (ref 3.5–5.2)
ALP SERPL-CCNC: 121 U/L (ref 35–104)
ALT SERPL W P-5'-P-CCNC: 39 U/L (ref 0–50)
ANION GAP SERPL CALCULATED.3IONS-SCNC: 11 MMOL/L (ref 7–15)
AST SERPL W P-5'-P-CCNC: 32 U/L (ref 0–45)
BASOPHILS # BLD AUTO: 0 10E3/UL (ref 0–0.2)
BASOPHILS NFR BLD AUTO: 0 %
BILIRUB SERPL-MCNC: 0.2 MG/DL
BUN SERPL-MCNC: 24.2 MG/DL (ref 6–20)
CALCIUM SERPL-MCNC: 9.4 MG/DL (ref 8.6–10)
CHLORIDE SERPL-SCNC: 101 MMOL/L (ref 98–107)
CREAT SERPL-MCNC: 0.73 MG/DL (ref 0.51–0.95)
CRP SERPL-MCNC: <3 MG/L
DEPRECATED HCO3 PLAS-SCNC: 25 MMOL/L (ref 22–29)
EGFRCR SERPLBLD CKD-EPI 2021: >90 ML/MIN/1.73M2
EOSINOPHIL # BLD AUTO: 0 10E3/UL (ref 0–0.7)
EOSINOPHIL NFR BLD AUTO: 0 %
ERYTHROCYTE [DISTWIDTH] IN BLOOD BY AUTOMATED COUNT: 11.8 % (ref 10–15)
GLUCOSE SERPL-MCNC: 91 MG/DL (ref 70–99)
HCT VFR BLD AUTO: 40.3 % (ref 35–47)
HGB BLD-MCNC: 13.3 G/DL (ref 11.7–15.7)
IMM GRANULOCYTES # BLD: 0 10E3/UL
IMM GRANULOCYTES NFR BLD: 0 %
LYMPHOCYTES # BLD AUTO: 1.3 10E3/UL (ref 0.8–5.3)
LYMPHOCYTES NFR BLD AUTO: 20 %
MCH RBC QN AUTO: 30.6 PG (ref 26.5–33)
MCHC RBC AUTO-ENTMCNC: 33 G/DL (ref 31.5–36.5)
MCV RBC AUTO: 93 FL (ref 78–100)
MONOCYTES # BLD AUTO: 0.5 10E3/UL (ref 0–1.3)
MONOCYTES NFR BLD AUTO: 7 %
NEUTROPHILS # BLD AUTO: 4.7 10E3/UL (ref 1.6–8.3)
NEUTROPHILS NFR BLD AUTO: 72 %
PLATELET # BLD AUTO: 246 10E3/UL (ref 150–450)
POTASSIUM SERPL-SCNC: 4.3 MMOL/L (ref 3.4–5.3)
PROT SERPL-MCNC: 7 G/DL (ref 6.4–8.3)
RBC # BLD AUTO: 4.35 10E6/UL (ref 3.8–5.2)
SODIUM SERPL-SCNC: 137 MMOL/L (ref 135–145)
TSH SERPL DL<=0.005 MIU/L-ACNC: 2.46 UIU/ML (ref 0.3–4.2)
VIT D+METAB SERPL-MCNC: 49 NG/ML (ref 20–50)
WBC # BLD AUTO: 6.5 10E3/UL (ref 4–11)

## 2023-11-06 PROCEDURE — 86140 C-REACTIVE PROTEIN: CPT

## 2023-11-06 PROCEDURE — 84443 ASSAY THYROID STIM HORMONE: CPT

## 2023-11-06 PROCEDURE — 99215 OFFICE O/P EST HI 40 MIN: CPT | Performed by: INTERNAL MEDICINE

## 2023-11-06 PROCEDURE — 86780 TREPONEMA PALLIDUM: CPT

## 2023-11-06 PROCEDURE — 73130 X-RAY EXAM OF HAND: CPT | Mod: TC | Performed by: RADIOLOGY

## 2023-11-06 PROCEDURE — 85025 COMPLETE CBC W/AUTO DIFF WBC: CPT

## 2023-11-06 PROCEDURE — 73030 X-RAY EXAM OF SHOULDER: CPT | Mod: TC | Performed by: RADIOLOGY

## 2023-11-06 PROCEDURE — 84080 ASSAY ALKALINE PHOSPHATASES: CPT | Mod: 90

## 2023-11-06 PROCEDURE — 86481 TB AG RESPONSE T-CELL SUSP: CPT

## 2023-11-06 PROCEDURE — 80053 COMPREHEN METABOLIC PANEL: CPT | Mod: 90

## 2023-11-06 PROCEDURE — 82306 VITAMIN D 25 HYDROXY: CPT

## 2023-11-06 PROCEDURE — 99417 PROLNG OP E/M EACH 15 MIN: CPT | Performed by: INTERNAL MEDICINE

## 2023-11-06 PROCEDURE — 99213 OFFICE O/P EST LOW 20 MIN: CPT | Performed by: INTERNAL MEDICINE

## 2023-11-06 PROCEDURE — 36415 COLL VENOUS BLD VENIPUNCTURE: CPT

## 2023-11-06 PROCEDURE — 82955 ASSAY OF G6PD ENZYME: CPT | Mod: 90

## 2023-11-06 PROCEDURE — 99000 SPECIMEN HANDLING OFFICE-LAB: CPT

## 2023-11-06 ASSESSMENT — PAIN SCALES - GENERAL: PAINLEVEL: MILD PAIN (3)

## 2023-11-06 NOTE — LETTER
11/6/2023       RE: Gricel Agarwal  [address] [address]     Dear Colleague,    Thank you for referring your patient, Gricel Agarwal, to the [facility] RHEUMATOLOGY at [facility]. Please see a copy of my visit note below.                       11/6/2023    Chief Complaint/Reason for Visit: psoriatic arthritis     HPI:    Gricel Agarwal presents is a 57 year old White female with past medical history listed below. 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 The patient used to follow up with , last seen in clinic in 2020. She has been on various meds in the past - Enbrel did not help with scalp psoriasis, methotrexate 5 mg okay'd by oncology - stopped due to leukopenia.  She also has history of elevated liver tests in 2018 but she was not on methotrexate at that time.  Sulfasalazine was proposed but never started it as oncology recommended Otezla instead. She took it for at least one year and she says that worked for her. One of the [facility] dermatologists started on Taltz - she had seizure in May and Taltz was stopped.  The patient reports having pain of her bilateral shoulders, hands and knees.  She denied having dactylitis, uveitis.  She however does report of having bilateral shoulder pain which radiates down all the way to her hands and also has numbness of her right thumb.  She denies having morning stiffness.  She continues to follow-up with dermatology.      REVIEW OF SYSTEMS    General -negative for fever, fatigue, chills  HEENT -negative for uveitis  Cardiovascular -negative for chest pain and shortness of breath  Respiratory - pos for history of asthma, currently denies shortness of breath or cough  Gastrointestinal -negative for blood in stools  Genitourinary -negative for blood in urine  Skin -continues to have some rashes from her psoriasis for which she follows up with dermatology and is on topical  medications  Neuro -numbness of right thumb, history of seizure  Hematologic - pos for easy bruising   Psych - pos for anxiety or depression   Smoking - never smoker, does not drink alcohol   Uses marijuana       No past medical history on file.  No past surgical history on file.  Family History   Problem Relation Age of Onset    Colon Cancer Father     Pancreatic Cancer Paternal Grandmother     Leukemia Paternal Grandfather      Social History     Socioeconomic History    Marital status:    Tobacco Use    Smoking status: Never    Smokeless tobacco: Never       No Known Allergies    Current Outpatient Medications   Medication    albuterol (VENTOLIN HFA) 108 (90 Base) MCG/ACT inhaler    calcipotriene (DOVONOX) 0.005 % external ointment    Cholecalciferol (VITAMIN D3) 1000 units CAPS    fluticasone-salmeterol (ADVAIR DISKUS) 250-50 MCG/DOSE inhaler    hydrocortisone 2.5 % ointment    IRON PO    levETIRAcetam (KEPPRA) 500 MG tablet    triamcinolone (KENALOG) 0.1 % external cream    triamcinolone (KENALOG) 0.1 % external ointment    venlafaxine (EFFEXOR-XR) 150 MG 24 hr capsule     No current facility-administered medications for this visit.       PHYSICAL EXAM    /76 (BP Location: Left arm, Patient Position: Sitting, Cuff Size: Adult Regular)   Pulse 75   Wt 84.8 kg (187 lb)   SpO2 100%   BMI 27.62 kg/m        General: Alert, No apparent distress Psych: Affect euthymic  Eyes: Sclera noninjected  Ears, Nose, Throat, Mouth: Oral mucosa moist with normal salivary pool  Skin: psoriatic lesions noted  Cardiovascular: Regular rate and rhythm, Normal S1 and S2 and No murmurs, rubs or gallops  Respiratory: Clear to auscultation with no wheezing or crackles  Neuro: Normal gait and Able to arise from seated position unassisted  Musculoskeletal: Hands:  Normal.  Negative for synovitis.  She is able to make a full fist.  Wrists:  Normal.  Elbows:  Normal.  Shoulders: Restricted range of motion due to pain of  shoulders.  Feet:  Normal.  Ankles:  Normal.  Knees:  Normal.  Tender points:  Negative.    LABS   Reviewed as below    Hepatitis C nonreactive  Hepatitis B core antibody, surface antigen nonreactive  Rheumatoid factor, CCP negative in 2017  HIV nonreactive in 2017  QTB neg 2017      ASSESSMENT      1. Psoriatic arthritis (H)    2. Psoriasis    3. History of breast cancer    4. Chronic pain of both shoulders    5. History of seizure      (L40.50) Psoriatic arthritis (H)  (primary encounter diagnosis)  Comment: She was diagnosed with psoriatic arthritis in 2019 and used to follow-up with Dr. Rivers.Enbrel did not help with scalp psoriasis, methotrexate 5 mg okay'd by oncology - stopped due to leukopenia.  She also has history of elevated liver tests in 2018 but she was not on methotrexate at that time.  Sulfasalazine was proposed but never started it as oncology recommended Otezla instead.  She stopped using Otezla after 1 year.  She was started on Taltz by an outside dermatologist in February 2023 but she stopped taking it in May when she developed a seizure.  Currently she is off of systemic medications for psoriatic arthritis.  She states she can deal with the pain and denied having dactylitis, enthesitis or uveitis.  On exam, there was no synovitis noted.  However she continues to have active psoriasis lesions for which she is following up with dermatology.   Plan:   Today I am not initiating any new therapies.  Given her history of cancer, I would like to discuss with her oncologist whether it would be okay to use IL/17 inhibitors.  Check labs and imaging as below.   Orders Placed This Encounter   Procedures    XR Hand Bilateral G/E 3 Views    X-ray bl Shoulder 3 Vws    Comprehensive metabolic panel    Glucose 6 phosphate dehydrogenase    Treponema Abs w Reflex to RPR and Titer    TSH with free T4 reflex    Vitamin D Deficiency    CRP inflammation    EMG    CBC with platelets differential    Quantiferon TB  Gold Plus     (L40.9) Psoriasis  Comment: on topicals   Plan: per Derm     (Z85.3) History of breast cancer  Comment: Diagnosed in 2015.  The patient underwent lumpectomy and was treated with anastrozole from April 2016 -2021.  She continues to follow-up with oncologist.  Plan: Per oncologist    (M25.511,  G89.29,  M25.512) Chronic pain of both shoulders  Comment: Suspect a mechanical cause of shoulder pain.  Plan:       Check bilateral shoulder x-rays    (Z87.898) History of seizure  Comment: Patient follows up with neurology.  On Keppra 500 mg twice daily.  Plan: Continue to follow-up with neurology.    RTC - 8 weeks     I spent minutes on the date of the encounter doing chart review, history and exam, documentation and orders per the note.    JEISON SARAVIA MD    Division of Rheumatic & Autoimmune Diseases  [facility]

## 2023-11-06 NOTE — PROGRESS NOTES
iso                   11/6/2023    Chief Complaint/Reason for Visit: psoriatic arthritis     HPI:    Gricel Agarwal presents is a 57 year old White female with past medical history listed below. 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 The patient used to follow up with , last seen in clinic in 2020. She has been on various meds in the past - Enbrel did not help with scalp psoriasis, methotrexate 5 mg okay'd by oncology - stopped due to leukopenia.  She also has history of elevated liver tests in 2018 but she was not on methotrexate at that time.  Sulfasalazine was proposed but never started it as oncology recommended Otezla instead. She took it for at least one year and she says that worked for her. One of the [facility] dermatologists started on Taltz - she had seizure in May and Taltz was stopped.  The patient reports having pain of her bilateral shoulders, hands and knees.  She denied having dactylitis, uveitis.  She however does report of having bilateral shoulder pain which radiates down all the way to her hands and also has numbness of her right thumb.  She denies having morning stiffness.  She continues to follow-up with dermatology.      REVIEW OF SYSTEMS    General -negative for fever, fatigue, chills  HEENT -negative for uveitis  Cardiovascular -negative for chest pain and shortness of breath  Respiratory - pos for history of asthma, currently denies shortness of breath or cough  Gastrointestinal -negative for blood in stools  Genitourinary -negative for blood in urine  Skin -continues to have some rashes from her psoriasis for which she follows up with dermatology and is on topical medications  Neuro -numbness of right thumb, history of seizure  Hematologic - pos for easy bruising   Psych - pos for anxiety or depression   Smoking - never smoker, does not drink alcohol   Uses marijuana       No past medical history on file.  No past surgical history on file.  Family History   Problem  Relation Age of Onset    Colon Cancer Father     Pancreatic Cancer Paternal Grandmother     Leukemia Paternal Grandfather      Social History     Socioeconomic History    Marital status:    Tobacco Use    Smoking status: Never    Smokeless tobacco: Never       No Known Allergies    Current Outpatient Medications   Medication    albuterol (VENTOLIN HFA) 108 (90 Base) MCG/ACT inhaler    calcipotriene (DOVONOX) 0.005 % external ointment    Cholecalciferol (VITAMIN D3) 1000 units CAPS    fluticasone-salmeterol (ADVAIR DISKUS) 250-50 MCG/DOSE inhaler    hydrocortisone 2.5 % ointment    IRON PO    levETIRAcetam (KEPPRA) 500 MG tablet    triamcinolone (KENALOG) 0.1 % external cream    triamcinolone (KENALOG) 0.1 % external ointment    venlafaxine (EFFEXOR-XR) 150 MG 24 hr capsule     No current facility-administered medications for this visit.       PHYSICAL EXAM    /76 (BP Location: Left arm, Patient Position: Sitting, Cuff Size: Adult Regular)   Pulse 75   Wt 84.8 kg (187 lb)   SpO2 100%   BMI 27.62 kg/m        General: Alert, No apparent distress Psych: Affect euthymic  Eyes: Sclera noninjected  Ears, Nose, Throat, Mouth: Oral mucosa moist with normal salivary pool  Skin: psoriatic lesions noted  Cardiovascular: Regular rate and rhythm, Normal S1 and S2 and No murmurs, rubs or gallops  Respiratory: Clear to auscultation with no wheezing or crackles  Neuro: Normal gait and Able to arise from seated position unassisted  Musculoskeletal: Hands:  Normal.  Negative for synovitis.  She is able to make a full fist.  Wrists:  Normal.  Elbows:  Normal.  Shoulders: Restricted range of motion due to pain of shoulders.  Feet:  Normal.  Ankles:  Normal.  Knees:  Normal.  Tender points:  Negative.    LABS   Reviewed as below    Hepatitis C nonreactive  Hepatitis B core antibody, surface antigen nonreactive  Rheumatoid factor, CCP negative in 2017  HIV nonreactive in 2017  QTB neg 2017      ASSESSMENT      1.  Psoriatic arthritis (H)    2. Psoriasis    3. History of breast cancer    4. Chronic pain of both shoulders    5. History of seizure      (L40.50) Psoriatic arthritis (H)  (primary encounter diagnosis)  Comment: She was diagnosed with psoriatic arthritis in 2019 and used to follow-up with Dr. Rivers.Enbrel did not help with scalp psoriasis, methotrexate 5 mg okay'd by oncology - stopped due to leukopenia.  She also has history of elevated liver tests in 2018 but she was not on methotrexate at that time.  Sulfasalazine was proposed but never started it as oncology recommended Otezla instead.  She stopped using Otezla after 1 year.  She was started on Taltz by an outside dermatologist in February 2023 but she stopped taking it in May when she developed a seizure.  Currently she is off of systemic medications for psoriatic arthritis.  She states she can deal with the pain and denied having dactylitis, enthesitis or uveitis.  On exam, there was no synovitis noted.  However she continues to have active psoriasis lesions for which she is following up with dermatology.   Plan:   Today I am not initiating any new therapies.  Given her history of cancer, I would like to discuss with her oncologist whether it would be okay to use IL/17 inhibitors.  Check labs and imaging as below.   Orders Placed This Encounter   Procedures    XR Hand Bilateral G/E 3 Views    X-ray bl Shoulder 3 Vws    Comprehensive metabolic panel    Glucose 6 phosphate dehydrogenase    Treponema Abs w Reflex to RPR and Titer    TSH with free T4 reflex    Vitamin D Deficiency    CRP inflammation    EMG    CBC with platelets differential    Quantiferon TB Gold Plus       (L40.9) Psoriasis  Comment: on topicals   Plan: per Derm     (Z85.3) History of breast cancer  Comment: Diagnosed in 2015.  The patient underwent lumpectomy and was treated with anastrozole from April 2016 -2021.  She continues to follow-up with oncologist.  Plan: Per  oncologist    (M25.511,  G89.29,  M25.512) Chronic pain of both shoulders  Comment: Suspect a mechanical cause of shoulder pain.  Plan:       Check bilateral shoulder x-rays    (Z87.898) History of seizure  Comment: Patient follows up with neurology.  On Keppra 500 mg twice daily.  Plan: Continue to follow-up with neurology.    RTC - 8 weeks     I spent 60 minutes on the date of the encounter doing chart review, history and exam, documentation and orders per the note.      JEISON SARAVIA MD    Division of Rheumatic & Autoimmune Diseases  [facility]

## 2023-11-06 NOTE — NURSING NOTE
Chief Complaint   Patient presents with    Consult     /76 (BP Location: Left arm, Patient Position: Sitting, Cuff Size: Adult Regular)   Pulse 75   Wt 84.8 kg (187 lb)   SpO2 100%   BMI 27.62 kg/m

## 2023-11-07 LAB — T PALLIDUM AB SER QL: NONREACTIVE

## 2023-11-08 LAB
G6PD RBC-CCNT: 13.4 U/G HB
GAMMA INTERFERON BACKGROUND BLD IA-ACNC: 0 IU/ML
M TB IFN-G BLD-IMP: NEGATIVE
M TB IFN-G CD4+ BCKGRND COR BLD-ACNC: 7.52 IU/ML
MITOGEN IGNF BCKGRD COR BLD-ACNC: 0 IU/ML
MITOGEN IGNF BCKGRD COR BLD-ACNC: 0 IU/ML
QUANTIFERON MITOGEN: 7.52 IU/ML
QUANTIFERON NIL TUBE: 0 IU/ML
QUANTIFERON TB1 TUBE: 0 IU/ML
QUANTIFERON TB2 TUBE: 0

## 2023-11-13 LAB
ALP BONE SERPL-CCNC: 44 U/L
ALP LIVER SERPL-CCNC: 76 U/L
ALP OTHER SERPL-CCNC: 0 U/L
ALP SERPL-CCNC: 120 U/L

## 2024-01-08 ENCOUNTER — OFFICE VISIT (OUTPATIENT)
Dept: RHEUMATOLOGY | Facility: CLINIC | Age: 59
End: 2024-01-08
Attending: INTERNAL MEDICINE
Payer: COMMERCIAL

## 2024-01-08 VITALS
DIASTOLIC BLOOD PRESSURE: 70 MMHG | HEART RATE: 78 BPM | WEIGHT: 190 LBS | BODY MASS INDEX: 28.06 KG/M2 | OXYGEN SATURATION: 99 % | SYSTOLIC BLOOD PRESSURE: 104 MMHG

## 2024-01-08 DIAGNOSIS — R20.0 NUMBNESS: ICD-10-CM

## 2024-01-08 DIAGNOSIS — L40.50 PSORIATIC ARTHRITIS (H): Primary | ICD-10-CM

## 2024-01-08 DIAGNOSIS — M15.9 GENERALIZED OA: ICD-10-CM

## 2024-01-08 DIAGNOSIS — L40.9 PSORIASIS: ICD-10-CM

## 2024-01-08 DIAGNOSIS — Z85.3 HISTORY OF BREAST CANCER: ICD-10-CM

## 2024-01-08 PROCEDURE — 99214 OFFICE O/P EST MOD 30 MIN: CPT | Performed by: INTERNAL MEDICINE

## 2024-01-08 PROCEDURE — 99213 OFFICE O/P EST LOW 20 MIN: CPT | Performed by: INTERNAL MEDICINE

## 2024-01-08 ASSESSMENT — PAIN SCALES - GENERAL: PAINLEVEL: NO PAIN (0)

## 2024-01-08 NOTE — NURSING NOTE
Chief Complaint   Patient presents with    RECHECK   /70 (BP Location: Left arm, Patient Position: Sitting, Cuff Size: Adult Large)   Pulse 78   Wt 86.2 kg (190 lb)   SpO2 99%   BMI 28.06 kg/m  Carina Small on 1/8/2024 at 3:12 PM

## 2024-01-08 NOTE — LETTER
1/8/2024       RE: Gricel Agarwal  [address] [facility] [address]     Dear Colleague,    Thank you for referring your patient, Gricel Agarwal, to the [facility] RHEUMATOLOGY at [facility]. Please see a copy of my visit note below.    iso                   11/6/2023    Chief Complaint/Reason for Visit: psoriatic arthritis     HPI:    Gricel Agarwal presents is a 57 year old White female with past medical history listed below. Today she is c/o pain of left shoulder, hands, right knee. Denied having swelling of hands and feet. She has gained 25 lbs in 6 months. Continues to have psoriatic lesions of legs - follows up with Derm and is on topicals.     REVIEW OF SYSTEMS    General -negative for fever, fatigue  HEENT -negative for uveitis  Cardiovascular -negative for chest pain, palpitations and shortness of breath  Respiratory - pos for history of asthma, currently denies shortness of breath or cough  Gastrointestinal -negative for blood in stools  Genitourinary -negative for blood in urine  Skin -continues to have some rashes from her psoriasis on bilateral shins for which she follows up with dermatology and is on topical medications  Neuro -numbness of right thumb, history of seizure  Hematologic - pos for easy bruising   Psych - pos for anxiety and depression, still on effexor   Smoking - never smoker, does not drink alcohol       No past medical history on file.  No past surgical history on file.  Family History   Problem Relation Age of Onset    Colon Cancer Father     Pancreatic Cancer Paternal Grandmother     Leukemia Paternal Grandfather      Social History     Socioeconomic History    Marital status:    Tobacco Use    Smoking status: Never    Smokeless tobacco: Never       No Known Allergies    Current Outpatient Medications   Medication    albuterol (VENTOLIN HFA) 108 (90 Base) MCG/ACT inhaler    calcipotriene (DOVONOX) 0.005 %  external ointment    Cholecalciferol (VITAMIN D3) 1000 units CAPS    fluticasone-salmeterol (ADVAIR DISKUS) 250-50 MCG/DOSE inhaler    hydrocortisone 2.5 % ointment    IRON PO    levETIRAcetam (KEPPRA) 500 MG tablet    triamcinolone (KENALOG) 0.1 % external cream    venlafaxine (EFFEXOR-XR) 150 MG 24 hr capsule    triamcinolone (KENALOG) 0.1 % external ointment     No current facility-administered medications for this visit.       PHYSICAL EXAM    /70 (BP Location: Left arm, Patient Position: Sitting, Cuff Size: Adult Large)   Pulse 78   Wt 86.2 kg (190 lb)   SpO2 99%   BMI 28.06 kg/m        General: Alert, No apparent distress Psych: Affect euthymic  Eyes: Sclera noninjected  Skin: psoriatic lesions noted on bilateral shins   Cardiovascular: Regular rate and rhythm, Normal S1 and S2 and No murmurs, rubs or gallops  Respiratory: Clear to auscultation with no wheezing or crackles  Neuro: Normal gait and Able to arise from seated position unassisted  Musculoskeletal: Hands:  Normal.  Negative for synovitis.  She is able to make a full fist.  Wrists:  Normal.  Elbows:  Normal.  Shoulders: Restricted range of motion due to pain of shoulders.  Feet:  Normal.  Ankles:  Normal.  Knees:  Normal.    LABS   Reviewed as below    Hepatitis C nonreactive  Hepatitis B core antibody, surface antigen nonreactive  Rheumatoid factor, CCP negative in 2017  HIV nonreactive in 2017  QTB neg 2017 Nov 2023  CRP, Vit D, TSH, treponema, G6PD,QTB normal/neg  CBC, CMP normal except for elevated - normal isoenzymes.       ASSESSMENT      1. Psoriatic arthritis (H)    2. Psoriasis    3. History of breast cancer    4. Generalized OA        (L40.50) Psoriatic arthritis (H)  (primary encounter diagnosis)  Comment: She was diagnosed with psoriatic arthritis in 2019 and used to follow-up with Dr. Rivers.Enbrel did not help with scalp psoriasis, methotrexate 5 mg okay'd by oncology - stopped due to leukopenia.  She also has  history of elevated liver tests in 2018 but she was not on methotrexate at that time.  Sulfasalazine was proposed but never started it as oncology recommended Otezla instead.  She stopped using Otezla after 1 year.  She was started on Taltz by an outside dermatologist in February 2023 but she stopped taking it in May when she developed a seizure.  Currently she is off of systemic medications for psoriatic arthritis.  She states she can deal with the pain and denied having dactylitis, enthesitis or uveitis.  On exam, there was no synovitis noted.  However she continues to have active psoriasis lesions for which she is following up with dermatology. At this time, the joint pain she is experiencing seems to be be from underlying OA rather than active psoriatic arthritis.   Plan:   Will message Derm to see if they would consider restarting Otezla.   Will do labs at next visit.   Orders Placed This Encounter   Procedures    EMG         (L40.9) Psoriasis  Comment: on topicals   Plan: per Derm     (Z85.3) History of breast cancer  Comment: Diagnosed in 2015.  The patient underwent lumpectomy and was treated with anastrozole from April 2016 -2021.  She continues to follow-up with oncologist.  Plan: Per oncologist    (Z87.898) History of seizure  Comment: Patient follows up with neurology.  On Keppra 500 mg twice daily.  Plan: Continue to follow-up with neurology.    Generalized OA  Comment : OA of left shoulder and hands  Plan : may use tylenol  not to exceed 2 grams in 24 hours prn     RTC - 8 weeks     I spent 32 minutes on the date of the encounter doing chart review, history and exam, documentation and orders per the note.      JEISON SARAVIA MD    Division of Rheumatic & Autoimmune Diseases  [facility]

## 2024-01-08 NOTE — PROGRESS NOTES
iso                   11/6/2023    Chief Complaint/Reason for Visit: psoriatic arthritis     HPI:    Gricel Agarwal presents is a 57 year old White female with past medical history listed below. Today she is c/o pain of left shoulder, hands, right knee. Denied having swelling of hands and feet. She has gained 25 lbs in 6 months. Continues to have psoriatic lesions of legs - follows up with Derm and is on topicals.     REVIEW OF SYSTEMS    General -negative for fever, fatigue  HEENT -negative for uveitis  Cardiovascular -negative for chest pain, palpitations and shortness of breath  Respiratory - pos for history of asthma, currently denies shortness of breath or cough  Gastrointestinal -negative for blood in stools  Genitourinary -negative for blood in urine  Skin -continues to have some rashes from her psoriasis on bilateral shins for which she follows up with dermatology and is on topical medications  Neuro -numbness of right thumb, history of seizure  Hematologic - pos for easy bruising   Psych - pos for anxiety and depression, still on effexor   Smoking - never smoker, does not drink alcohol       No past medical history on file.  No past surgical history on file.  Family History   Problem Relation Age of Onset    Colon Cancer Father     Pancreatic Cancer Paternal Grandmother     Leukemia Paternal Grandfather      Social History     Socioeconomic History    Marital status:    Tobacco Use    Smoking status: Never    Smokeless tobacco: Never       No Known Allergies    Current Outpatient Medications   Medication    albuterol (VENTOLIN HFA) 108 (90 Base) MCG/ACT inhaler    calcipotriene (DOVONOX) 0.005 % external ointment    Cholecalciferol (VITAMIN D3) 1000 units CAPS    fluticasone-salmeterol (ADVAIR DISKUS) 250-50 MCG/DOSE inhaler    hydrocortisone 2.5 % ointment    IRON PO    levETIRAcetam (KEPPRA) 500 MG tablet    triamcinolone (KENALOG) 0.1 % external cream    venlafaxine (EFFEXOR-XR) 150 MG 24 hr  capsule    triamcinolone (KENALOG) 0.1 % external ointment     No current facility-administered medications for this visit.       PHYSICAL EXAM    /70 (BP Location: Left arm, Patient Position: Sitting, Cuff Size: Adult Large)   Pulse 78   Wt 86.2 kg (190 lb)   SpO2 99%   BMI 28.06 kg/m        General: Alert, No apparent distress Psych: Affect euthymic  Eyes: Sclera noninjected  Skin: psoriatic lesions noted on bilateral shins   Cardiovascular: Regular rate and rhythm, Normal S1 and S2 and No murmurs, rubs or gallops  Respiratory: Clear to auscultation with no wheezing or crackles  Neuro: Normal gait and Able to arise from seated position unassisted  Musculoskeletal: Hands:  Normal.  Negative for synovitis.  She is able to make a full fist.  Wrists:  Normal.  Elbows:  Normal.  Shoulders: Restricted range of motion due to pain of shoulders.  Feet:  Normal.  Ankles:  Normal.  Knees:  Normal.    LABS   Reviewed as below    Hepatitis C nonreactive  Hepatitis B core antibody, surface antigen nonreactive  Rheumatoid factor, CCP negative in 2017  HIV nonreactive in 2017  QTB neg 2017 Nov 2023  CRP, Vit D, TSH, treponema, G6PD,QTB normal/neg  CBC, CMP normal except for elevated - normal isoenzymes.       ASSESSMENT      1. Psoriatic arthritis (H)    2. Psoriasis    3. History of breast cancer    4. Generalized OA        (L40.50) Psoriatic arthritis (H)  (primary encounter diagnosis)  Comment: She was diagnosed with psoriatic arthritis in 2019 and used to follow-up with Dr. Rivers.Enbrel did not help with scalp psoriasis, methotrexate 5 mg okay'd by oncology - stopped due to leukopenia.  She also has history of elevated liver tests in 2018 but she was not on methotrexate at that time.  Sulfasalazine was proposed but never started it as oncology recommended Otezla instead.  She stopped using Otezla after 1 year.  She was started on Taltz by an outside dermatologist in February 2023 but she stopped taking  it in May when she developed a seizure.  Currently she is off of systemic medications for psoriatic arthritis.  She states she can deal with the pain and denied having dactylitis, enthesitis or uveitis.  On exam, there was no synovitis noted.  However she continues to have active psoriasis lesions for which she is following up with dermatology. At this time, the joint pain she is experiencing seems to be be from underlying OA rather than active psoriatic arthritis.   Plan:   Will message Derm to see if they would consider restarting Otezla.   Will do labs at next visit.   Orders Placed This Encounter   Procedures    EMG         (L40.9) Psoriasis  Comment: on topicals   Plan: per Derm     (Z85.3) History of breast cancer  Comment: Diagnosed in 2015.  The patient underwent lumpectomy and was treated with anastrozole from April 2016 -2021.  She continues to follow-up with oncologist.  Plan: Per oncologist    (Z87.898) History of seizure  Comment: Patient follows up with neurology.  On Keppra 500 mg twice daily.  Plan: Continue to follow-up with neurology.    Generalized OA  Comment : OA of left shoulder and hands  Plan : may use tylenol  not to exceed 2 grams in 24 hours prn     RTC - 8 weeks     I spent 32 minutes on the date of the encounter doing chart review, history and exam, documentation and orders per the note.      JEISON SARAVIA MD    Division of Rheumatic & Autoimmune Diseases  [facility]

## 2024-01-09 ENCOUNTER — TELEPHONE (OUTPATIENT)
Dept: RHEUMATOLOGY | Facility: CLINIC | Age: 59
End: 2024-01-09
Payer: COMMERCIAL

## 2024-01-09 DIAGNOSIS — L40.9 PSORIASIS: Primary | ICD-10-CM

## 2024-01-09 NOTE — TELEPHONE ENCOUNTER
, I saw our mutual patient in clinic yesterday. At this time, most of her joint symptoms seem to be caused from underlying OA rather than psoriatic arthritis. If symptoms are worsening, we can get a MRI to look for inflammation. From a derm perspective do you think she would need to be on a systemic agent? Looking forward to hearing your thoughts. Thank you.

## 2024-01-10 NOTE — TELEPHONE ENCOUNTER
Nicolle Raphael MD  [facility] Adult [facility]2 hours ago (7:41 AM)     PAM  Please let the patient know that Rheum and I have been communicating.  Would she be willing to try Otezla again?  Is her oncologist OK with the Otezla?     Rekha Norton MD Bohjanen, Kimberly Ann, MD18 hours ago (4:22 PM)     AD  I am ok with Otezla as long as it works for her skin and if heme-onc does not have an issue. Thank you.     Nicolle Raphael MD Das, Aparna, MD; Aaliyah Sarkar, RNYesterday (9:28 AM)     KB  Thanks for seeing her.  I do think the skin would benefit from systemic treatment but I was worried about her painful joints.  Do you have a preference for a systemic agent for her?

## 2024-01-10 NOTE — TELEPHONE ENCOUNTER
Called and spoke with pt. She would very much like to try Otezla again, states her oncologist is OK with her using Otezla as well.

## 2024-01-10 NOTE — ADDENDUM NOTE
Addended by: GULSHAN NEGRON on: 1/10/2024 12:28 PM     Modules accepted: Orders     Subjective   Lucian is a 14 y.o. male who presents today with his mother for his Health Maintenance and Supervision Exam.    General Health:  Lucian is overall in good health.  Concerns today: No    Nutrition:  Balanced diet? Yes    Elimination:  Elimination patterns appropriate: Yes    Sleep:  Sleep patterns appropriate? Yes    Development/Education:  Lucian is in 9th grade in public school at [place] .  Review of Systems   Constitutional:  Negative for fatigue and fever.   HENT:  Negative for congestion, ear pain, hearing loss, rhinorrhea and sore throat.    Eyes:  Negative for visual disturbance.   Respiratory:  Negative for cough, shortness of breath and wheezing.    Cardiovascular:  Negative for chest pain.   Gastrointestinal:  Negative for blood in stool, constipation, diarrhea, nausea and vomiting.   Genitourinary:  Negative for dysuria and hematuria.   Musculoskeletal:  Negative for arthralgias and myalgias.   Skin:  Negative for rash.   Neurological:  Negative for seizures, numbness and headaches.   Hematological:  Does not bruise/bleed easily.   Psychiatric/Behavioral:  Negative for dysphoric mood, sleep disturbance and suicidal ideas. The patient is not nervous/anxious.       Objective   Physical Exam  Vitals and nursing note reviewed.   Constitutional:       General: He is not in acute distress.     Appearance: Normal appearance.   HENT:      Head: Normocephalic and atraumatic.      Right Ear: Tympanic membrane, ear canal and external ear normal.      Left Ear: Tympanic membrane, ear canal and external ear normal.      Nose: Nose normal.      Mouth/Throat:      Mouth: Mucous membranes are moist.      Pharynx: Oropharynx is clear.   Eyes:      Extraocular Movements: Extraocular movements intact.      Conjunctiva/sclera: Conjunctivae normal.      Pupils: Pupils are equal, round, and reactive to light.   Cardiovascular:      Rate and Rhythm: Normal rate and regular rhythm.      Heart sounds: Normal heart  sounds.   Pulmonary:      Effort: Pulmonary effort is normal.      Breath sounds: Normal breath sounds.   Abdominal:      General: Abdomen is flat. Bowel sounds are normal.      Palpations: Abdomen is soft.   Musculoskeletal:         General: No deformity.      Cervical back: Neck supple.   Lymphadenopathy:      Cervical: No cervical adenopathy.   Skin:     General: Skin is warm and dry.   Neurological:      General: No focal deficit present.      Mental Status: He is alert.   Psychiatric:         Mood and Affect: Mood normal.         Behavior: Behavior normal.         Assessment/Plan   Healthy 14 y.o. male child.  1. Anticipatory guidance discussed.  Gave handout on well-child issues at this age.  2.   Orders Placed This Encounter   Procedures    Flu vaccine (IIV4) age 6 months and greater, preservative free    Hearing screen    Visual acuity screening    POCT hemoglobin manually resulted     3. Follow-up visit in 1 year for next well child visit, or sooner as needed.   4. Immunizations per order   5.Depression screen reviewed

## 2024-01-11 NOTE — PROGRESS NOTES
Medication Therapy Management (MTM) Encounter    ASSESSMENT:                            Psoriasis and psoriatic arthritis: Needs improvement.  Agree with initiating Otezla again, as it sounds like it was helpful for her in the past and it would help both her psoriasis and psoriatic arthritis.  In addition, this was okayed by oncology.  No issue with her seizure hx. Last renal function was normal.  Today we discussed the dose titration and side effects, will closely monitor her weight, mood, and GI effects.  We also discussed the specialty pharmacy process and I will be in touch with her on [employer] regarding coverage.  No current drug interactions.    History of breast cancer: Continue to follow with oncology.    Anxiety: Continue to follow with psych in her therapist.  We will closely monitor her mood after starting Otezla.    Seizure history: Patient to continue current regimen and following with neurology.    Asthma: Well-controlled.      PLAN:                            Order sent for Otezla (dosing below) to [facility] (#[phone]). They will contact patient pending insurance coverage.       Follow-up: Via [employer] pending coverage    SUBJECTIVE/OBJECTIVE:                          Gricel Agarwal is a 58 year old female called for an initial visit. She was referred to me from Dr. Raphael.      Reason for visit: Otezla for psoriasis   Medication Adherence/Access: no issues reported.  She was familiar with her medications.    Psoriasis and psoriatic arthritis:   - Triamcinolone 0.1% ointment as needed legs  - calcipotriene 0.005% ointment as needed elbows  - hydrocortisone 2.5% ointment as needed on eyebrows and nose.   Patient reports that she was previously on Otezla, but May 2023 she started to have seizures therefore she discontinued on her own.  Since being off Otezla, she has had worsening psoriatic arthritis and psoriasis.  Reports that in the past when she was taking Otezla, it was  helpful for her psoriatic arthritis and psoriasis symptoms.  She also believes it was well-tolerated.  She had a follow-up with rheumatology on 1/8/2024, and they recommended restarting Otezla (also okayed by oncology and Derm)  Past medications: Enbrel (not helpful for scalp psoriasis), methotrexate (leukopenia), Taltz    Creatinine   Date Value Ref Range Status   11/06/2023 0.73 0.51 - 0.95 mg/dL Final     History of breast cancer:  Diagnosed in 2015.  The patient underwent lumpectomy and was treated with anastrozole from April 2016 -2021.  She continues to follow-up with oncologist.    Anxiety:   -Venlafaxine  mg x 2 (300 mg) once daily  Underwent neuropsych testing on 12/6/2023 and diagnosed with PTSD and VAL  Follows with a therapist, reports that she had an appointment today.  She feels that her mood is well-controlled.    Seizure history:  -Levetiracetam 500 mg twice daily  Started on Keppra in May 2023 after a first-time seizure  Follows with neurology.    Asthma:  -Advair 250-50 1 puff 1-2 times daily.   -Albuterol HFA as needed   Patient rinses her mouth after Advair.  Is not using albuterol very often, except last week she did since she had a cold.    Today's Vitals: There were no vitals taken for this visit.    Allergies/ADRs: Reviewed in chart  Past Medical History: Reviewed in chart  Tobacco: She reports that she has never smoked. She has never used smokeless tobacco.  Alcohol: Reviewed in chart    ----------------      I spent 12 minutes with this patient today. All changes were made via collaborative practice agreement with Nicolle Raphael. A copy of the visit note was provided to the patient's provider(s).    A summary of these recommendations was declined by the patient.    Venessa Jasso, Pharm.D., BCACP   Medication Therapy Management Pharmacist   [facility]    Telemedicine Visit Details  Type of service:  Telephone visit  Start Time:  2:31 PM  End Time:  2:43 PM      Medication Therapy Recommendations  No medication therapy recommendations to display

## 2024-01-12 ENCOUNTER — VIRTUAL VISIT (OUTPATIENT)
Dept: RHEUMATOLOGY | Facility: CLINIC | Age: 59
End: 2024-01-12
Attending: DERMATOLOGY
Payer: COMMERCIAL

## 2024-01-12 DIAGNOSIS — L40.50 PSORIATIC ARTHRITIS (H): Primary | ICD-10-CM

## 2024-01-12 DIAGNOSIS — F41.9 ANXIETY: ICD-10-CM

## 2024-01-12 DIAGNOSIS — Z87.898 HISTORY OF SEIZURE: ICD-10-CM

## 2024-01-12 DIAGNOSIS — J45.909 UNCOMPLICATED ASTHMA, UNSPECIFIED ASTHMA SEVERITY, UNSPECIFIED WHETHER PERSISTENT: ICD-10-CM

## 2024-01-12 DIAGNOSIS — Z85.3 HISTORY OF BREAST CANCER: ICD-10-CM

## 2024-01-12 NOTE — Clinical Note
1/12/2024       RE: Gricel Agarwal  [address] [facility] [facility] [address]     Dear Colleague,    Thank you for referring your patient, Gricel Agarwal, to the [facility] RHEUMATOLOGY CLINIC [place] at [facility]. Please see a copy of my visit note below.    Medication Therapy Management (MTM) Encounter    ASSESSMENT:                            ***:  ***      PLAN:                            ***    Follow-up: {followuptest2:907944}    SUBJECTIVE/OBJECTIVE:                          Gricel Agarwal is a 58 year old female called for an initial visit. She was referred to me from Dr. Raphael.      Reason for visit: Otezla for psoriasis   Medication Adherence/Access: no issues reported.  She was familiar with her medications.    Psoriasis and psoriatic arthritis:   - Triamcinolone 0.1% ointment legs  - calcipotriene 0.005% ointment elbows  - hydrocortisone 2.5% ointment on eyebrows and nose.   Patient was previously on Taltz, outside dermatology which was stopped May 2023 due to seizures, but unclear that they were related.  Since being off Otezla patient has had worsening and arthritis symptoms and she was referred back to rheumatology.  Having pain bilateral shoulders, hands, knees.  She was on Otezla for at least a year and it was helpful.  Later switched to Taltz?  Met with rheumatology on 11/6/2023 and today connected with oncology about approval to start IL-17.  She had a follow-up with rheumatology again on 1/8/2024, and they recommended restarting Otezla (also okayed by oncology)  Past medications: Enbrel (not helpful for scalp psoriasis), methotrexate (leukopenia).     Seizure. She made  Skin on scalp,   Thinks it was helpful, not as fast. Toleraring.     Creatinine   Date Value Ref Range Status   11/06/2023 0.73 0.51 - 0.95 mg/dL Final     History of breast cancer:  Diagnosed in 2015.  The patient underwent lumpectomy and was treated with anastrozole  "from April 2016 -2021.  She continues to follow-up with oncologist.    Anxiety:   -Venlafaxine  mg x 2 (300 mg) once daily  Underwent neuropsych testing on 12/6/2023 and diagnosed with PTSD and VAL  Therapist.     Seizure history:  -Levetiracetam 500 mg twice daily  Started on Keppra in May 2023 after a first-time seizure  Follows with neurology.    Asthma:  -Advair 250-50 1 puff 1-2 times daily. Rinses.   -Albuterol HFA as needed - not often. Las t wekk cold.     Vitamin D?  Last vitamin D level = 49 on 11/6/2023  Iron?  Last hemoglobin level = 13.3 on 11/6/2023  Mvi.     Today's Vitals: There were no vitals taken for this visit.    Allergies/ADRs: {1/2/3/4/5:805274}  Past Medical History: {1/2/3/4/5:855942}  Tobacco: She reports that she has never smoked. She has never used smokeless tobacco.  Alcohol: {ALCOHOL CONSUMPTION HX:092118}  {Social and Goals:091511}  ----------------  {NIALL?:467226}    I spent {mtm total time 3:858786} with this patient today. { :661422}. A copy of the visit note was provided to the patient's provider(s).    A summary of these recommendations {GIVEN/NOT GIVEN:838759}.    Venessa Jasso, Pharm.D., BCACP   Medication Therapy Management Pharmacist   [facility] Dermatology    Telemedicine Visit Details  Type of service:  {telemedvisitmtm:471515::\"Telephone visit\"}  Start Time: {video/phone visit start time:152948}  End Time: {video/phone visit end time:152948}     Medication Therapy Recommendations  No medication therapy recommendations to display       Medication Therapy Management (MTM) Encounter    ASSESSMENT:                            Psoriasis and psoriatic arthritis: Needs improvement.  Agree with initiating Otezla again, as it sounds like it was helpful for her in the past and it would help both her psoriasis and psoriatic arthritis.  In addition, this was okayed by oncology.  Last renal function was normal.  Today we discussed the dose titration and side effects, will " closely monitor her weight, mood, and GI effects.  We also discussed the specialty pharmacy process and I will be in touch with her on [employer] regarding coverage.  No current drug interactions.    History of breast cancer: Continue to follow with oncology.    Anxiety: Continue to follow with psych in her therapist.  We will closely monitor her mood after starting Otezla.    Seizure history: Patient to continue current regimen and following with neurology.    Asthma: Well-controlled.      PLAN:                            Order sent for Otezla (dosing below) to [facility] (#[phone]). They will contact patient pending insurance coverage.       Follow-up: Via [employer] pending coverage    SUBJECTIVE/OBJECTIVE:                          Gricel Agarwal is a 58 year old female called for an initial visit. She was referred to me from Dr. Raphael.      Reason for visit: Otezla for psoriasis   Medication Adherence/Access: no issues reported.  She was familiar with her medications.    Psoriasis and psoriatic arthritis:   - Triamcinolone 0.1% ointment as needed legs  - calcipotriene 0.005% ointment as needed elbows  - hydrocortisone 2.5% ointment as needed on eyebrows and nose.   Patient reports that she was previously on Otezla, but May 2023 she started to have seizures therefore she discontinued on her own.  Since being off Otezla, she has had worsening psoriatic arthritis and psoriasis.  Reports that in the past when she was taking Otezla, it was helpful for her psoriatic arthritis and psoriasis symptoms.  She also believes it was well-tolerated.  She had a follow-up with rheumatology on 1/8/2024, and they recommended restarting Otezla (also okayed by oncology and Derm)  Past medications: Enbrel (not helpful for scalp psoriasis), methotrexate (leukopenia), Taltz    Creatinine   Date Value Ref Range Status   11/06/2023 0.73 0.51 - 0.95 mg/dL Final     History of breast cancer:  Diagnosed in 2015.  The  patient underwent lumpectomy and was treated with anastrozole from April 2016 -2021.  She continues to follow-up with oncologist.    Anxiety:   -Venlafaxine  mg x 2 (300 mg) once daily  Underwent neuropsych testing on 12/6/2023 and diagnosed with PTSD and VAL  Follows with a therapist, reports that she had an appointment today.  She feels that her mood is well-controlled.    Seizure history:  -Levetiracetam 500 mg twice daily  Started on Keppra in May 2023 after a first-time seizure  Follows with neurology.    Asthma:  -Advair 250-50 1 puff 1-2 times daily.   -Albuterol HFA as needed   Patient rinses her mouth after Advair.  Is not using albuterol very often, except last week she did since she had a cold.    Today's Vitals: There were no vitals taken for this visit.    Allergies/ADRs: Reviewed in chart  Past Medical History: Reviewed in chart  Tobacco: She reports that she has never smoked. She has never used smokeless tobacco.  Alcohol: Reviewed in chart    ----------------      I spent 12 minutes with this patient today. All changes were made via collaborative practice agreement with Nicolle Raphael. A copy of the visit note was provided to the patient's provider(s).    A summary of these recommendations was declined by the patient.    Venessa Sylvester, Pharm.D., [facility]   Medication Therapy Management Pharmacist   [facility] Dermatology    Telemedicine Visit Details  Type of service:  Telephone visit  Start Time:  2:31 PM  End Time:  2:43 PM     Medication Therapy Recommendations  No medication therapy recommendations to display         Again, thank you for allowing me to participate in the care of your patient.      Sincerely,    VENESSA SYLVESTER PharmD

## 2024-01-15 ENCOUNTER — TELEPHONE (OUTPATIENT)
Dept: DERMATOLOGY | Facility: CLINIC | Age: 59
End: 2024-01-15
Payer: COMMERCIAL

## 2024-01-23 ENCOUNTER — TELEPHONE (OUTPATIENT)
Dept: RHEUMATOLOGY | Facility: CLINIC | Age: 59
End: 2024-01-23
Payer: COMMERCIAL

## 2024-01-23 ENCOUNTER — OFFICE VISIT (OUTPATIENT)
Dept: NEUROLOGY | Facility: CLINIC | Age: 59
End: 2024-01-23
Attending: INTERNAL MEDICINE
Payer: COMMERCIAL

## 2024-01-23 DIAGNOSIS — G56.03 BILATERAL CARPAL TUNNEL SYNDROME: Primary | ICD-10-CM

## 2024-01-23 DIAGNOSIS — G89.29 CHRONIC PAIN OF BOTH SHOULDERS: ICD-10-CM

## 2024-01-23 DIAGNOSIS — M25.512 CHRONIC PAIN OF BOTH SHOULDERS: ICD-10-CM

## 2024-01-23 DIAGNOSIS — M25.511 CHRONIC PAIN OF BOTH SHOULDERS: ICD-10-CM

## 2024-01-23 NOTE — LETTER
1/23/2024       RE: Gricel Agarwal  [address] [facility] [address]       Dear Colleague,    Thank you for referring your patient, Gricel Agarwal, to the  [facility] at [facility]. Please see a copy of my visit note below.            [facility]  Electrodiagnostic Laboratory    Nerve Conduction & EMG Report          Patient:       Gricel Agarwal  Patient ID:    [id number]  Gender:        Female  YOB: 1965  Age:           58 Years 1 Months        History & Examination:  58 year old with intermittent numbness and pain in both hands. The symptoms are worse in the morning when she wakes from sleep. She also endorses left shoulder and neck pain. This study is being performed to investigate for radiculopathy vs focal neuropathy.     Techniques: Motor and sensory conduction studies were done with surface recording electrodes. EMG was done with a concentric needle electrode.      Results:  Nerve conduction studies:  1. Bilateral median-D2 sensory responses show normal amplitudes and mildly slowed CV.   2. Bilateral ulnar-D5 and left radial sensory responses are normal.   3. Bilateral median-ulnar palmar interlatency differences are prolonged.   4. Bilateral median-APB motor responses show mildly prolonged DL, normal amplitudes and normal CV.   5. Bilateral ulnar-ADM motor responses are normal.   6. Bilateral median-lumbrical vs ulnar-interosseous latency differences are prolonged.     Needle EMG of selected proximal and distal right and left upper limb muscles was performed as tabulated below. No abnormal spontaneous activity was observed in the sampled muscles. Motor unit potential morphology and recruitment patterns were normal.     Interpretation:  This is an abnormal study. There is electrophysiologic evidence of mild to moderate median neuropathies at the wrist on both sides, as can be seen in the clinical  context of bilateral carpal tunnel syndrome. There is no evidence of a cervical radiculopathy affecting the right or left upper limbs on the basis of this study. Clinical correlation is recommended.     Joselito Arita MD  Department of Neurology             Sensory NCS      Nerve / Sites Rec. Site Onset Peak Ref. NP Amp Ref. PP Amp Dist Bradley Ref. Temp     ms ms ms  V  V  V cm m/s m/s  C   R MEDIAN - Dig II Anti      Wrist Dig II 3.23 4.43  11.7 10.0 25.0 14 43.4 48.0 31   L MEDIAN - Dig II Anti      Wrist Dig II 3.02 3.85  19.4 10.0 42.7 14 46.3 48.0 30.2   R ULNAR - Dig V Anti      Wrist Dig V 2.60 3.49  17.6 8.0 125.0 12.5 48.0 48.0 31   L ULNAR - Dig V Anti      Wrist Dig V 2.45 3.23  15.7 8.0 95.9 12.5 51.1 48.0 30.3   L RADIAL - Snuff      Forearm Snuff 1.82 2.50  29.4 15.0 37.7 10 54.9 48.0 30.2   R MEDIAN - Ulnar - Palmar      Median Wrist 2.50 3.13 2.40 19.2  27.6 8 32.0  30.7      Ulnar Wrist 1.61 2.34 2.40 29.8  36.9 8 49.5  30.8   L MEDIAN - Ulnar - Palmar      Median Wrist 2.03 2.81 2.40 33.0  33.4 8 39.4  30.1      Ulnar Wrist 1.56 2.24 2.40 26.4  28.4 8 51.2  30.2       Motor NCS      Nerve / Sites Rec. Site Lat Ref. Amp Ref. Rel Amp Dist Bradley Ref. Dur. Area Temp.     ms ms mV mV % cm m/s m/s ms %  C   R MEDIAN - APB      Wrist APB 5.05 4.40 9.8 5.0 100 8   6.15 100 31      Elbow APB 9.06  8.9  90.8 22 54.9 48.0 6.87 95.4 31   L MEDIAN - APB      Wrist APB 5.16 4.40 8.0 5.0 100 8   5.42 100 30.3      Elbow APB 9.22  8.1  101 20 49.2 48.0 5.78 94.3 30.4      Med Elbow ADM 8.39  0.4  4.77    3.49 5.29 30.4   R ULNAR - ADM      Wrist ADM 3.33 3.50 10.4 5.0 100 8   8.07 100 31      B.Elbow ADM 6.93  9.8  94.1 20 55.7 48.0 8.18 96.5 31      A.Elbow ADM 8.54  9.6  91.7 9 55.7 48.0 7.76 90.3 31   L ULNAR - ADM      Wrist ADM 2.66 3.50 9.0 5.0 100 8   7.14 100 30.3      B.Elbow ADM 6.09  7.8  86.1 20 58.2 48.0 6.98 96.7 30.2      A.Elbow ADM 7.81  7.9  87.4 10 58.2 48.0 7.19 91.7 30.2   R MEDIAN - II Lumb       Median II Lumb 4.38  1.3  100 10   7.03 100 31      Ulnar Palm Int 3.28  4.7  370 10   6.46 232 31   L MEDIAN - II Lumb      Median II Lumb 4.17  1.2  100 10   7.60 100 29.8      Ulnar Palm Int 3.18  4.8  388 10   6.04 232 29.9       EMG Summary Table     Spontaneous MUAP Recruitment    IA Fib/PSW Fasc H.F. Amp Dur. PPP Pattern   L. DELTOID N None None None N N N Normal   L. BICEPS N None None None N N N Normal   L. TRICEPS N None None None N N N Normal   L. PRON TERES N None None None N N N Normal   L. FIRST D INTEROSS N None None None N N N Normal   R. DELTOID N None None None N N N Normal   R. TRICEPS N None None None N N N Normal   R. FIRST D INTEROSS N None None None N N N Normal                                      Again, thank you for allowing me to participate in the care of your patient.      Sincerely,    Joselito Arita MD

## 2024-01-23 NOTE — PROGRESS NOTES
[facility]  Electrodiagnostic Laboratory    Nerve Conduction & EMG Report          Patient:       Gricel Agarwal  Patient ID:    [id number]  Gender:        Female  YOB: 1965  Age:           58 Years 1 Months        History & Examination:  58 year old with intermittent numbness and pain in both hands. The symptoms are worse in the morning when she wakes from sleep. She also endorses left shoulder and neck pain. This study is being performed to investigate for radiculopathy vs focal neuropathy.     Techniques: Motor and sensory conduction studies were done with surface recording electrodes. EMG was done with a concentric needle electrode.      Results:  Nerve conduction studies:  1. Bilateral median-D2 sensory responses show normal amplitudes and mildly slowed CV.   2. Bilateral ulnar-D5 and left radial sensory responses are normal.   3. Bilateral median-ulnar palmar interlatency differences are prolonged.   4. Bilateral median-APB motor responses show mildly prolonged DL, normal amplitudes and normal CV.   5. Bilateral ulnar-ADM motor responses are normal.   6. Bilateral median-lumbrical vs ulnar-interosseous latency differences are prolonged.     Needle EMG of selected proximal and distal right and left upper limb muscles was performed as tabulated below. No abnormal spontaneous activity was observed in the sampled muscles. Motor unit potential morphology and recruitment patterns were normal.     Interpretation:  This is an abnormal study. There is electrophysiologic evidence of mild to moderate median neuropathies at the wrist on both sides, as can be seen in the clinical context of bilateral carpal tunnel syndrome. There is no evidence of a cervical radiculopathy affecting the right or left upper limbs on the basis of this study. Clinical correlation is recommended.     Joselito Arita MD  Department of Neurology             Sensory NCS      Nerve / Sites Rec. Site Onset Peak Ref.  NP Amp Ref. PP Amp Dist Bradley Ref. Temp     ms ms ms  V  V  V cm m/s m/s  C   R MEDIAN - Dig II Anti      Wrist Dig II 3.23 4.43  11.7 10.0 25.0 14 43.4 48.0 31   L MEDIAN - Dig II Anti      Wrist Dig II 3.02 3.85  19.4 10.0 42.7 14 46.3 48.0 30.2   R ULNAR - Dig V Anti      Wrist Dig V 2.60 3.49  17.6 8.0 125.0 12.5 48.0 48.0 31   L ULNAR - Dig V Anti      Wrist Dig V 2.45 3.23  15.7 8.0 95.9 12.5 51.1 48.0 30.3   L RADIAL - Snuff      Forearm Snuff 1.82 2.50  29.4 15.0 37.7 10 54.9 48.0 30.2   R MEDIAN - Ulnar - Palmar      Median Wrist 2.50 3.13 2.40 19.2  27.6 8 32.0  30.7      Ulnar Wrist 1.61 2.34 2.40 29.8  36.9 8 49.5  30.8   L MEDIAN - Ulnar - Palmar      Median Wrist 2.03 2.81 2.40 33.0  33.4 8 39.4  30.1      Ulnar Wrist 1.56 2.24 2.40 26.4  28.4 8 51.2  30.2       Motor NCS      Nerve / Sites Rec. Site Lat Ref. Amp Ref. Rel Amp Dist Bradley Ref. Dur. Area Temp.     ms ms mV mV % cm m/s m/s ms %  C   R MEDIAN - APB      Wrist APB 5.05 4.40 9.8 5.0 100 8   6.15 100 31      Elbow APB 9.06  8.9  90.8 22 54.9 48.0 6.87 95.4 31   L MEDIAN - APB      Wrist APB 5.16 4.40 8.0 5.0 100 8   5.42 100 30.3      Elbow APB 9.22  8.1  101 20 49.2 48.0 5.78 94.3 30.4      Med Elbow ADM 8.39  0.4  4.77    3.49 5.29 30.4   R ULNAR - ADM      Wrist ADM 3.33 3.50 10.4 5.0 100 8   8.07 100 31      B.Elbow ADM 6.93  9.8  94.1 20 55.7 48.0 8.18 96.5 31      A.Elbow ADM 8.54  9.6  91.7 9 55.7 48.0 7.76 90.3 31   L ULNAR - ADM      Wrist ADM 2.66 3.50 9.0 5.0 100 8   7.14 100 30.3      B.Elbow ADM 6.09  7.8  86.1 20 58.2 48.0 6.98 96.7 30.2      A.Elbow ADM 7.81  7.9  87.4 10 58.2 48.0 7.19 91.7 30.2   R MEDIAN - II Lumb      Median II Lumb 4.38  1.3  100 10   7.03 100 31      Ulnar Palm Int 3.28  4.7  370 10   6.46 232 31   L MEDIAN - II Lumb      Median II Lumb 4.17  1.2  100 10   7.60 100 29.8      Ulnar Palm Int 3.18  4.8  388 10   6.04 232 29.9       EMG Summary Table     Spontaneous MUAP Recruitment    IA Fib/PSW Fasc H.F. Amp  Dur. PPP Pattern   L. DELTOID N None None None N N N Normal   L. BICEPS N None None None N N N Normal   L. TRICEPS N None None None N N N Normal   L. PRON TERES N None None None N N N Normal   L. FIRST D INTEROSS N None None None N N N Normal   R. DELTOID N None None None N N N Normal   R. TRICEPS N None None None N N N Normal   R. FIRST D INTEROSS N None None None N N N Normal

## 2024-01-24 NOTE — TELEPHONE ENCOUNTER
Noted bilateral carpal tunnel on EMG. Placing ref to Ortho hand. Please let the patient know, thank you.

## 2024-02-12 NOTE — TELEPHONE ENCOUNTER
Prior Authorization Approval    Medication: OTEZLA 10 & 20 & 30 MG PO TBPK  Authorization Effective Date: 1/13/2024  Authorization Expiration Date: 8/10/2024  Approved Dose/Quantity: Loading dose then 60 tabs per 30 days  Reference #: [id number]   Insurance Company: [employer] - Phone [phone] Fax [phone]  Expected CoPay: $    CoPay Card Available:      Financial Assistance Needed: Unknown  Which Pharmacy is filling the prescription: [address]  Pharmacy Notified: Released Rx  Patient Notified: Sending message        PA Initiation    Medication: OTEZLA 10 & 20 & 30 MG PO TBPK  Insurance Company: [employer] - Phone [phone] Fax [phone]  Pharmacy Filling the Rx: [facility] [address]  Filling Pharmacy Phone:    Filling Pharmacy Fax:    Start Date: 2/12/2024

## 2024-02-19 NOTE — TELEPHONE ENCOUNTER
Sent [employer] message to patient with instructions on Otezla.     Ewelina Woo, PharmD  MTM Pharmacist

## 2024-02-26 ENCOUNTER — OFFICE VISIT (OUTPATIENT)
Dept: DERMATOLOGY | Facility: CLINIC | Age: 59
End: 2024-02-26
Attending: DERMATOLOGY
Payer: COMMERCIAL

## 2024-02-26 DIAGNOSIS — L40.9 PSORIASIS: ICD-10-CM

## 2024-02-26 DIAGNOSIS — L40.0 PSORIASIS VULGARIS: ICD-10-CM

## 2024-02-26 PROCEDURE — 99213 OFFICE O/P EST LOW 20 MIN: CPT | Performed by: DERMATOLOGY

## 2024-02-26 RX ORDER — TRIAMCINOLONE ACETONIDE 1 MG/G
OINTMENT TOPICAL
Qty: 454 G | Refills: 3 | Status: SHIPPED | OUTPATIENT
Start: 2024-02-26

## 2024-02-26 ASSESSMENT — PAIN SCALES - GENERAL: PAINLEVEL: NO PAIN (0)

## 2024-02-26 NOTE — NURSING NOTE
Dermatology Rooming Note    Gricel Agarwal's goals for this visit include:   Chief Complaint   Patient presents with    Psoriasis     Overall condition is stable  Areas have calmed down     Marielena Davis LPN     Area L Indication Text: Tumors in this location are included in Area L (trunk and extremities).  Mohs surgery is indicated for larger tumors, 2 cm or larger, in these anatomic locations.

## 2024-02-26 NOTE — LETTER
2/26/2024       RE: Gricel Agarwal  [address] [facility] [address]     Dear Colleague,    Thank you for referring your patient, Gricel Agarwal, to the [facility] DERMATOLOGY CLINIC [place] at [facility]. Please see a copy of my visit note below.    [facility] Dermatology Note  Encounter Date: Feb 26, 2024  Office Visit     Dermatology Problem List:  1.Psoriasis and psoriatic arthritis.    --Current:                -Triamcinolone 0.1% ointment/calcipotriene ointment/ 2.5% hydrocortisone (refilled 10/31/23)              -Otezla per rheumatology stopped 05/2023 due to seizures (unsure whether or not seizures related to medication). Plan to restart when insurance approves.  --Past: Enbrel used back in 2012, methotrexate test dose 2018 with leukopenia.   --Consider future adjunctive nbUVB  --H/o breast cancer    ____________________________________________    Assessment & Plan:     # Psoriasis- improved despite not being able to start Otezla again yet  Arthritis currently is tolerable  - Continue to work to get Otezla  - Continue Triamcinolone 0.1% ointment/calcipotriene ointment/ 2.5% hydrocortisone      Follow-up: 4 month(s) in-person, or earlier for new or changing lesions    Staff:     Nicolle Raphael MD  ____________________________________________    CC: Psoriasis (Overall condition is stable/Areas have calmed down)    HPI:  Ms. Gricel Agarwal is a(n) 58 year old female who presents today as a return patient for psoriasis and psoriatic arthritis.  Follows with derm and rheum.  Plan is to do a retrial of Otezla and currently working with MTM team to get coverage.  Currently doing well with topical steroids and did tanning twice.  Joint symptoms are tolerable and not affecting her activities or quality of life    Patient is otherwise feeling well, without additional skin concerns.     Labs Reviewed:  N/A    Physical  Exam:  Vitals: There were no vitals taken for this visit.  SKIN:   - thin plaques of elbows, and legs  - No other lesions of concern on areas examined.     Medications:  Current Outpatient Medications   Medication    albuterol (VENTOLIN HFA) 108 (90 Base) MCG/ACT inhaler    calcipotriene (DOVONOX) 0.005 % external ointment    Cholecalciferol (VITAMIN D3) 1000 units CAPS    fluticasone-salmeterol (ADVAIR DISKUS) 250-50 MCG/DOSE inhaler    hydrocortisone 2.5 % ointment    IRON PO    levETIRAcetam (KEPPRA) 500 MG tablet    triamcinolone (KENALOG) 0.1 % external ointment    venlafaxine (EFFEXOR-XR) 150 MG 24 hr capsule    Apremilast (OTEZLA) 10 & 20 & 30 MG TBPK    apremilast (OTEZLA) 30 MG tablet     No current facility-administered medications for this visit.      Past Medical History:   Patient Active Problem List   Diagnosis   (none) - all problems resolved or deleted     No past medical history on file.    CC Nicolle Raphael MD  [address] [facility] [address]  [place] [address] on close of this encounter.

## 2024-02-26 NOTE — PROGRESS NOTES
[facility] Dermatology Note  Encounter Date: Feb 26, 2024  Office Visit     Dermatology Problem List:  1.Psoriasis and psoriatic arthritis.    --Current:                -Triamcinolone 0.1% ointment/calcipotriene ointment/ 2.5% hydrocortisone (refilled 10/31/23)              -Otezla per rheumatology stopped 05/2023 due to seizures (unsure whether or not seizures related to medication). Plan to restart when insurance approves.  --Past: Enbrel used back in 2012, methotrexate test dose 2018 with leukopenia.   --Consider future adjunctive nbUVB  --H/o breast cancer    ____________________________________________    Assessment & Plan:     # Psoriasis- improved despite not being able to start Otezla again yet  Arthritis currently is tolerable  - Continue to work to get Otezla  - Continue Triamcinolone 0.1% ointment/calcipotriene ointment/ 2.5% hydrocortisone      Follow-up: 4 month(s) in-person, or earlier for new or changing lesions    Staff:     Nicolle Raphael MD  ____________________________________________    CC: Psoriasis (Overall condition is stable/Areas have calmed down)    HPI:  Ms. Gricel Agarwal is a(n) 58 year old female who presents today as a return patient for psoriasis and psoriatic arthritis.  Follows with derm and rheum.  Plan is to do a retrial of Otezla and currently working with MTM team to get coverage.  Currently doing well with topical steroids and did tanning twice.  Joint symptoms are tolerable and not affecting her activities or quality of life    Patient is otherwise feeling well, without additional skin concerns.     Labs Reviewed:  N/A    Physical Exam:  Vitals: There were no vitals taken for this visit.  SKIN:   - thin plaques of elbows, and legs  - No other lesions of concern on areas examined.     Medications:  Current Outpatient Medications   Medication    albuterol (VENTOLIN HFA) 108 (90 Base) MCG/ACT inhaler    calcipotriene (DOVONOX) 0.005 % external ointment     Cholecalciferol (VITAMIN D3) 1000 units CAPS    fluticasone-salmeterol (ADVAIR DISKUS) 250-50 MCG/DOSE inhaler    hydrocortisone 2.5 % ointment    IRON PO    levETIRAcetam (KEPPRA) 500 MG tablet    triamcinolone (KENALOG) 0.1 % external ointment    venlafaxine (EFFEXOR-XR) 150 MG 24 hr capsule    Apremilast (OTEZLA) 10 & 20 & 30 MG TBPK    apremilast (OTEZLA) 30 MG tablet     No current facility-administered medications for this visit.      Past Medical History:   Patient Active Problem List   Diagnosis   (none) - all problems resolved or deleted     No past medical history on file.    CC Nicolle Raphael MD  [address] [facility] [address]  [place] [address] on close of this encounter.

## 2024-05-16 DIAGNOSIS — L40.0 PSORIASIS VULGARIS: Primary | ICD-10-CM

## 2024-05-22 NOTE — PROGRESS NOTES
Rewriting MTM referral under different dept. Patient has already been seen by MTM and will not be contacted.

## 2024-07-08 ENCOUNTER — LAB (OUTPATIENT)
Dept: LAB | Facility: CLINIC | Age: 59
End: 2024-07-08
Payer: COMMERCIAL

## 2024-07-08 ENCOUNTER — OFFICE VISIT (OUTPATIENT)
Dept: RHEUMATOLOGY | Facility: CLINIC | Age: 59
End: 2024-07-08
Attending: INTERNAL MEDICINE
Payer: COMMERCIAL

## 2024-07-08 VITALS
OXYGEN SATURATION: 99 % | SYSTOLIC BLOOD PRESSURE: 104 MMHG | BODY MASS INDEX: 26.29 KG/M2 | WEIGHT: 178 LBS | HEART RATE: 78 BPM | DIASTOLIC BLOOD PRESSURE: 71 MMHG

## 2024-07-08 DIAGNOSIS — L40.50 PSORIATIC ARTHRITIS (H): ICD-10-CM

## 2024-07-08 DIAGNOSIS — Z79.899 LONG-TERM USE OF HIGH-RISK MEDICATION: ICD-10-CM

## 2024-07-08 DIAGNOSIS — L40.50 PSORIATIC ARTHRITIS (H): Primary | ICD-10-CM

## 2024-07-08 DIAGNOSIS — Z85.3 HISTORY OF BREAST CANCER: ICD-10-CM

## 2024-07-08 DIAGNOSIS — Z87.898 HISTORY OF SEIZURE: ICD-10-CM

## 2024-07-08 DIAGNOSIS — M15.9 GENERALIZED OA: ICD-10-CM

## 2024-07-08 DIAGNOSIS — L40.9 PSORIASIS: ICD-10-CM

## 2024-07-08 LAB
ALBUMIN SERPL BCG-MCNC: 4.1 G/DL (ref 3.5–5.2)
ALP SERPL-CCNC: 112 U/L (ref 40–150)
ALT SERPL W P-5'-P-CCNC: 25 U/L (ref 0–50)
ANION GAP SERPL CALCULATED.3IONS-SCNC: 7 MMOL/L (ref 7–15)
AST SERPL W P-5'-P-CCNC: 20 U/L (ref 0–45)
BASOPHILS # BLD AUTO: 0 10E3/UL (ref 0–0.2)
BASOPHILS NFR BLD AUTO: 0 %
BILIRUB SERPL-MCNC: 0.2 MG/DL
BUN SERPL-MCNC: 13.6 MG/DL (ref 6–20)
CALCIUM SERPL-MCNC: 8.7 MG/DL (ref 8.6–10)
CHLORIDE SERPL-SCNC: 107 MMOL/L (ref 98–107)
CREAT SERPL-MCNC: 0.64 MG/DL (ref 0.51–0.95)
CRP SERPL-MCNC: <3 MG/L
DEPRECATED HCO3 PLAS-SCNC: 27 MMOL/L (ref 22–29)
EGFRCR SERPLBLD CKD-EPI 2021: >90 ML/MIN/1.73M2
EOSINOPHIL # BLD AUTO: 0 10E3/UL (ref 0–0.7)
EOSINOPHIL NFR BLD AUTO: 0 %
ERYTHROCYTE [DISTWIDTH] IN BLOOD BY AUTOMATED COUNT: 12.5 % (ref 10–15)
GLUCOSE SERPL-MCNC: 95 MG/DL (ref 70–99)
HCT VFR BLD AUTO: 37.6 % (ref 35–47)
HGB BLD-MCNC: 12.3 G/DL (ref 11.7–15.7)
IMM GRANULOCYTES # BLD: 0 10E3/UL
IMM GRANULOCYTES NFR BLD: 0 %
LYMPHOCYTES # BLD AUTO: 1.2 10E3/UL (ref 0.8–5.3)
LYMPHOCYTES NFR BLD AUTO: 26 %
MCH RBC QN AUTO: 30.8 PG (ref 26.5–33)
MCHC RBC AUTO-ENTMCNC: 32.7 G/DL (ref 31.5–36.5)
MCV RBC AUTO: 94 FL (ref 78–100)
MONOCYTES # BLD AUTO: 0.4 10E3/UL (ref 0–1.3)
MONOCYTES NFR BLD AUTO: 8 %
NEUTROPHILS # BLD AUTO: 3.1 10E3/UL (ref 1.6–8.3)
NEUTROPHILS NFR BLD AUTO: 66 %
PLATELET # BLD AUTO: 250 10E3/UL (ref 150–450)
POTASSIUM SERPL-SCNC: 4.1 MMOL/L (ref 3.4–5.3)
PROT SERPL-MCNC: 6.2 G/DL (ref 6.4–8.3)
RBC # BLD AUTO: 4 10E6/UL (ref 3.8–5.2)
SODIUM SERPL-SCNC: 141 MMOL/L (ref 135–145)
WBC # BLD AUTO: 4.7 10E3/UL (ref 4–11)

## 2024-07-08 PROCEDURE — 99213 OFFICE O/P EST LOW 20 MIN: CPT | Performed by: INTERNAL MEDICINE

## 2024-07-08 PROCEDURE — 99214 OFFICE O/P EST MOD 30 MIN: CPT | Performed by: INTERNAL MEDICINE

## 2024-07-08 PROCEDURE — 80053 COMPREHEN METABOLIC PANEL: CPT

## 2024-07-08 PROCEDURE — 86140 C-REACTIVE PROTEIN: CPT

## 2024-07-08 PROCEDURE — G2211 COMPLEX E/M VISIT ADD ON: HCPCS | Performed by: INTERNAL MEDICINE

## 2024-07-08 PROCEDURE — 85025 COMPLETE CBC W/AUTO DIFF WBC: CPT

## 2024-07-08 PROCEDURE — 36415 COLL VENOUS BLD VENIPUNCTURE: CPT

## 2024-07-08 ASSESSMENT — PAIN SCALES - GENERAL: PAINLEVEL: MILD PAIN (3)

## 2024-07-08 NOTE — LETTER
7/8/2024       RE: Gricel Agarwal  [address] [facility] [address]     Dear Colleague,    Thank you for referring your patient, Gricel Agarwal, to the [facility] RHEUMATOLOGY at [facility]. Please see a copy of my visit note below.                         Chief Complaint/Reason for Visit: psoriatic arthritis     HPI:    Gricel Agarwal presents is a 57 year old White female with past medical history listed below. She restarted otezla a month ago and ran out of script. She is currently awaiting insurance approval ( prescribed by derm). She says her skin feels great after being on it. Denied having joint pain or swelling either.       REVIEW OF SYSTEMS    General -negative for fever, fatigue  HEENT -negative for uveitis  Cardiovascular -negative for chest pain, palpitations and shortness of breath  Respiratory - pos for history of asthma, currently denies shortness of breath or cough  Gastrointestinal -negative for blood in stool  Genitourinary -negative for blood in urine  Skin -has rash only on right elbow, rest of her skin has cleared up   Neuro -numbness of right thumb only when she wakes up and does not happen during the day, history of seizure +  Hematologic - pos for easy bruising   Psych - pos for anxiety and depression, still on effexor   Smoking - never smoker, does not drink alcohol   No use of recreational drugs       No past medical history on file.  No past surgical history on file.  Family History   Problem Relation Age of Onset    Colon Cancer Father     Pancreatic Cancer Paternal Grandmother     Leukemia Paternal Grandfather      Social History     Socioeconomic History    Marital status:    Tobacco Use    Smoking status: Never    Smokeless tobacco: Never       No Known Allergies    Current Outpatient Medications   Medication Sig Dispense Refill    albuterol (VENTOLIN HFA) 108 (90 Base) MCG/ACT inhaler Inhale 2 puffs into the  lungs every 6 hours as needed      Apremilast (OTEZLA) 10 & 20 & 30 MG TBPK Take 1 tablet in the morning on day 1, then take 1 tablet every 12 hours on day 2 through 14, according to the instructions on the packet. (Patient not taking: Reported on 2/26/2024) 55 each 0    apremilast (OTEZLA) 30 MG tablet Take 1 tablet (30 mg) by mouth 2 times daily (Patient not taking: Reported on 2/26/2024) 60 tablet 2    calcipotriene (DOVONOX) 0.005 % external ointment Apply topically 2 times daily with triamcinolone 120 g 11    Cholecalciferol (VITAMIN D3) 1000 units CAPS       fluticasone-salmeterol (ADVAIR DISKUS) 250-50 MCG/DOSE inhaler Inhale 1 puff into the lungs 2 times daily      hydrocortisone 2.5 % ointment Use daily to rash on face, ears, underarms, or groin 60 g 3    IRON PO Take 1 tablet by mouth      levETIRAcetam (KEPPRA) 500 MG tablet Take 500 mg by mouth 2 times daily      triamcinolone (KENALOG) 0.1 % external ointment Use at night to rash on body 454 g 3    venlafaxine (EFFEXOR-XR) 150 MG 24 hr capsule Take 300 mg by mouth daily       No current facility-administered medications for this visit.       PHYSICAL EXAM    /71 (BP Location: Right arm, Patient Position: Sitting, Cuff Size: Adult Regular)   Pulse 78   Wt 80.7 kg (178 lb)   SpO2 99%   BMI 26.29 kg/m        General: Alert, No apparent distress Psych: Affect euthymic  Eyes: Sclera noninjected  Skin: psoriatic lesions noted on right elbow   Cardiovascular: Regular rate and rhythm, Normal S1 and S2 and No murmurs, rubs or gallops  Respiratory: Clear to auscultation with no wheezing or crackles  Neuro: Normal gait and Able to arise from seated position unassisted  Musculoskeletal: Hands:  Normal.  Negative for synovitis.  She is able to make a full fist.  Wrists:  Normal.  Elbows:  Normal.  Shoulders: Restricted range of motion limited by pain  Feet:  Normal.  Ankles:  Normal.  Knees:  Normal.    LABS   Reviewed as below    Nov 2023  QTB  neg    Hepatitis C nonreactive  Hepatitis B core antibody, surface antigen nonreactive  Rheumatoid factor, CCP negative in 2017  HIV nonreactive in 2017  QTB neg 2017 Nov 2023  CRP, Vit D, TSH, treponema, G6PD,QTB normal/neg  CBC, CMP normal except for elevated - normal isoenzymes.       ASSESSMENT      1. Psoriatic arthritis (H)    2. Psoriasis    3. History of breast cancer    4. History of seizure    5. Generalized OA    6. Long-term use of high-risk medication          (L40.50) Psoriatic arthritis (H)  (primary encounter diagnosis)  Comment: She was diagnosed with psoriatic arthritis in 2019 and used to follow-up with Dr. Rivers.Enbrel did not help with scalp psoriasis, methotrexate 5 mg okay'd by oncology - stopped due to leukopenia.  She also has history of elevated liver tests in 2018 but she was not on methotrexate at that time.  Sulfasalazine was proposed but never started it as oncology recommended Otezla instead.  She stopped using Otezla after 1 year.  She developed a seizure in May 2023 and was told to stay away from injectables. Restarted otzela in May 2024. Prescription managed  by derm. No synovitis on exam.     Plan:   Continue otezla 30 mg BID   Orders Placed This Encounter   Procedures    Comprehensive metabolic panel    CRP inflammation    CBC with platelets differential       (L40.9) Psoriasis  Comment: on topicals and otezla  Plan: per Derm     (Z85.3) History of breast cancer  Comment: Diagnosed in 2015.  The patient underwent lumpectomy and was treated with anastrozole from April 2016 -2021.  She continues to follow-up with oncologist.  Plan: Per oncologist    (Z87.898) History of seizure  Comment: Patient follows up with neurology.  On Keppra 500 mg twice daily.  Plan: Continue to follow-up with neurology.    Generalized OA  Comment : OA of left shoulder and hands  Plan : may use tylenol  not to exceed 2 grams in 24 hours prn     Long term use of high-risk medication   Comment :  Otezla  Plan : have discussed with potential adverse effects with use of biologics such as suppression of immune system making pt more prone to infections, to hold the medication if the patient has any signs of any infection and to restart only after infection has cleared, reactivation of infections particularly TB and hepatitis B, C, fungal infections, risk of demyelinating disorders and to stop medication immediately if there's any weakness or paresthesias, risk of malignancy. Patient verbalized understanding and agrees to proceed.      RTC - 6 months     I spent 30 minutes on the date of the encounter doing chart review, history and exam, documentation and orders per the note.      JEISON SARAVIA MD    Division of Rheumatic & Autoimmune Diseases  [facility]

## 2024-07-08 NOTE — NURSING NOTE
Chief Complaint   Patient presents with    RECHECK     return     /71 (BP Location: Right arm, Patient Position: Sitting, Cuff Size: Adult Regular)   Pulse 78   Wt 80.7 kg (178 lb)   SpO2 99%   BMI 26.29 kg/m    Shy GERMAN

## 2024-07-29 ENCOUNTER — TELEPHONE (OUTPATIENT)
Dept: DERMATOLOGY | Facility: CLINIC | Age: 59
End: 2024-07-29
Payer: COMMERCIAL

## 2024-07-29 NOTE — TELEPHONE ENCOUNTER
Prior Authorization Approval    Medication: OTEZLA 30 MG PO TABS  Authorization Effective Date: 6/29/2024  Authorization Expiration Date: 7/29/2025  Approved Dose/Quantity: 60 per 30  Reference #: [id number]   Insurance Company: [employer] Non-Specialty PA's - Phone [phone] Fax [phone]  Expected CoPay: $    CoPay Card Available:      Financial Assistance Needed: na  Which Pharmacy is filling the prescription:    Pharmacy Notified: no  Patient Notified:         Sharifa Liang [facility] Specialty Pharmacy [email or web address]  [email or web address]  Phone: [phone]  Fax: [phone]

## 2024-09-17 DIAGNOSIS — L40.50 PSORIATIC ARTHRITIS (H): ICD-10-CM

## 2024-09-24 RX ORDER — APREMILAST 30 MG/1
30 TABLET, FILM COATED ORAL 2 TIMES DAILY
Qty: 60 TABLET | Refills: 0 | Status: SHIPPED | OUTPATIENT
Start: 2024-09-24

## 2024-09-24 NOTE — TELEPHONE ENCOUNTER
1 month sent to carry her into appointment on 10/1 -- will send further refills at that time.    - - -

## 2024-09-24 NOTE — TELEPHONE ENCOUNTER
apremilast (OTEZLA) 30 MG tablet  Last Written Prescription Date:  1/12/2024  Last Fill Quantity: 60,   # refills: 2  Last Office Visit : 2/26/2024  Future Office visit:  10/1/2024  Routing apremilast (OTEZLA) 30 MG tablet refill request to provider for review/approval because: Medication not on the Derm refill protocol.

## 2024-10-07 ENCOUNTER — TELEPHONE (OUTPATIENT)
Dept: DERMATOLOGY | Facility: CLINIC | Age: 59
End: 2024-10-07
Payer: COMMERCIAL

## 2024-10-07 NOTE — TELEPHONE ENCOUNTER
10/7 Left Voicemail (1st Attempt) and Sent Mychart (1st Attempt) for the patient to call back and schedule the following:    Appointment type: Return Dermatology  Provider: Ijeoma  Return date: 10/23/2024  Specialty phone number: [phone]  Additional appointment(s) needed: n/a  Additonal Notes: n/a

## 2024-10-10 ENCOUNTER — TELEPHONE (OUTPATIENT)
Dept: DERMATOLOGY | Facility: CLINIC | Age: 59
End: 2024-10-10
Payer: COMMERCIAL

## 2024-10-10 NOTE — TELEPHONE ENCOUNTER
10/10 Left Voicemail (1st Attempt) and Sent Mychart (1st Attempt) for the patient to call back and schedule the following:    Appointment type: Return Dermatology  Provider: Ijeoma  Return date: next available  Specialty phone number: [phone]  Additional appointment(s) needed: n/a  Additonal Notes: n/a

## 2024-10-15 ENCOUNTER — TELEPHONE (OUTPATIENT)
Dept: DERMATOLOGY | Facility: CLINIC | Age: 59
End: 2024-10-15
Payer: COMMERCIAL

## 2024-10-15 NOTE — TELEPHONE ENCOUNTER
10/15 Patient confirmed scheduled appointment:  Date: 5/27/2025  Time: 8:15 am  Visit type: Return Dermatology  Provider: Ijeoma  Location: [facility]  Testing/imaging: n/a  Additional notes: n/a

## 2024-10-20 ENCOUNTER — HEALTH MAINTENANCE LETTER (OUTPATIENT)
Age: 59
End: 2024-10-20

## 2024-10-29 ENCOUNTER — VIRTUAL VISIT (OUTPATIENT)
Dept: PHARMACY | Facility: CLINIC | Age: 59
End: 2024-10-29
Attending: DERMATOLOGY
Payer: COMMERCIAL

## 2024-10-29 DIAGNOSIS — L40.50 PSORIATIC ARTHRITIS (H): ICD-10-CM

## 2024-10-29 RX ORDER — APREMILAST 30 MG/1
30 TABLET, FILM COATED ORAL 2 TIMES DAILY
Qty: 60 TABLET | Refills: 6 | Status: SHIPPED | OUTPATIENT
Start: 2024-10-29

## 2024-10-29 RX ORDER — LAMOTRIGINE 25 MG/1
TABLET ORAL
COMMUNITY
Start: 2024-09-16

## 2024-10-29 NOTE — PROGRESS NOTES
"Medication Therapy Management (MTM) Encounter    ASSESSMENT:                            Psoriasis and psoriatic arthritis: Significant improvement in skin and joints since starting Otezla.  Appears to be tolerating well.  Last creatinine normal.  Encouraged her to continue to follow with rheumatology and dermatology.  I will refill her Otezla until her next appointment with Dr. Raphael on 5/27/2025. No current drug interactions.     Seizure history: Patient to continue current regimen and following with neurology.    PLAN:                            Otezla 30 mg TWICE DAILY refilled to [facility]    Follow-up: 5/27/25 with Dr. Raphael, [facility] to follow up after    SUBJECTIVE/OBJECTIVE:                          Gricel Agarwal is a 58 year old female called for a follow-up visit. She was referred to me from Dr. Raphael.      Reason for visit: Otezla follow-up for psoriasis   Medication Adherence/Access: no issues reported.  She was familiar with her medications.    Psoriasis and psoriatic arthritis:   0 Otezla 30 mg twice daily  - Triamcinolone 0.1% ointment as needed legs  - calcipotriene 0.005% ointment as needed elbows  - hydrocortisone 2.5% ointment as needed on eyebrows and nose.   Patient reports that she was previously on Otezla, but May 2023 she started to have seizures therefore she discontinued on her own.  Since being off Otezla, she has had worsening psoriatic arthritis and psoriasis.    At last MTM appointment, we restarted her on Otezla and she reports that her skin is \"amazing\".  Her skin is the best it has looked, even her fingernails have improved.  She does find that it has been helpful for her joints.  She denies any side effects including seizures, changes to mood, weight loss, or GI side effects.  She is also following with rheumatology.  She missed her last appointment with dermatology, but was able to reschedule until end of May but she is due for refills via [facility]  Past medications: Enbrel (not " helpful for scalp psoriasis), methotrexate (leukopenia), Taltz  Creatinine   Date Value Ref Range Status   07/08/2024 0.64 0.51 - 0.95 mg/dL Final     Seizure history:  -Levetiracetam 500 mg twice daily  -Lamotrigine 25 mg x 2 (50 mg) twice daily  Started on Keppra in May 2023 after a first-time seizure.  Reports that she is currently working with neurology to take her off Keppra and taper up on lamotrigine.  No recent seizures.      Today's Vitals: There were no vitals taken for this visit.    Allergies/ADRs: Reviewed in chart  Past Medical History: Reviewed in chart  Tobacco: She reports that she has never smoked. She has never used smokeless tobacco.  Alcohol: Reviewed in chart    ----------------    I spent 5 minutes with this patient today. All changes were made via collaborative practice agreement with Nicolle Raphael. A copy of the visit note was provided to the patient's provider(s).    A summary of these recommendations was declined by the patient.    Venessa Jasso, Pharm.D., BCACP   Medication Therapy Management Pharmacist   [facility]     Medication Therapy Recommendations  No medication therapy recommendations to display     Telemedicine Visit Details  The patient's medications can be safely assessed via a telemedicine encounter.  Type of service:  Telephone visit  Originating Location (pt. Location): Home    Distant Location (provider location):  Off-site  Start Time:  1:44 PM  End Time:  1:49 PM   participated in exercise

## 2025-01-13 ENCOUNTER — LAB (OUTPATIENT)
Dept: LAB | Facility: CLINIC | Age: 60
End: 2025-01-13
Payer: COMMERCIAL

## 2025-01-13 ENCOUNTER — OFFICE VISIT (OUTPATIENT)
Dept: RHEUMATOLOGY | Facility: CLINIC | Age: 60
End: 2025-01-13
Attending: INTERNAL MEDICINE
Payer: COMMERCIAL

## 2025-01-13 VITALS
WEIGHT: 177 LBS | SYSTOLIC BLOOD PRESSURE: 109 MMHG | HEART RATE: 70 BPM | BODY MASS INDEX: 26.14 KG/M2 | DIASTOLIC BLOOD PRESSURE: 70 MMHG | OXYGEN SATURATION: 100 %

## 2025-01-13 DIAGNOSIS — L40.50 PSORIATIC ARTHRITIS (H): Primary | ICD-10-CM

## 2025-01-13 DIAGNOSIS — M15.9 GENERALIZED OA: ICD-10-CM

## 2025-01-13 DIAGNOSIS — Z85.3 HISTORY OF BREAST CANCER: ICD-10-CM

## 2025-01-13 DIAGNOSIS — L40.9 PSORIASIS: ICD-10-CM

## 2025-01-13 DIAGNOSIS — Z79.899 LONG-TERM USE OF HIGH-RISK MEDICATION: ICD-10-CM

## 2025-01-13 DIAGNOSIS — L40.50 PSORIATIC ARTHRITIS (H): ICD-10-CM

## 2025-01-13 LAB
BASOPHILS # BLD AUTO: 0 10E3/UL (ref 0–0.2)
BASOPHILS NFR BLD AUTO: 0 %
EOSINOPHIL # BLD AUTO: 0 10E3/UL (ref 0–0.7)
EOSINOPHIL NFR BLD AUTO: 0 %
ERYTHROCYTE [DISTWIDTH] IN BLOOD BY AUTOMATED COUNT: 12.4 % (ref 10–15)
HCT VFR BLD AUTO: 35.5 % (ref 35–47)
HGB BLD-MCNC: 11.8 G/DL (ref 11.7–15.7)
IMM GRANULOCYTES # BLD: 0 10E3/UL
IMM GRANULOCYTES NFR BLD: 0 %
LYMPHOCYTES # BLD AUTO: 1.9 10E3/UL (ref 0.8–5.3)
LYMPHOCYTES NFR BLD AUTO: 28 %
MCH RBC QN AUTO: 30.6 PG (ref 26.5–33)
MCHC RBC AUTO-ENTMCNC: 33.2 G/DL (ref 31.5–36.5)
MCV RBC AUTO: 92 FL (ref 78–100)
MONOCYTES # BLD AUTO: 0.6 10E3/UL (ref 0–1.3)
MONOCYTES NFR BLD AUTO: 8 %
NEUTROPHILS # BLD AUTO: 4.3 10E3/UL (ref 1.6–8.3)
NEUTROPHILS NFR BLD AUTO: 64 %
PLATELET # BLD AUTO: 228 10E3/UL (ref 150–450)
RBC # BLD AUTO: 3.85 10E6/UL (ref 3.8–5.2)
WBC # BLD AUTO: 6.8 10E3/UL (ref 4–11)

## 2025-01-13 PROCEDURE — 99213 OFFICE O/P EST LOW 20 MIN: CPT | Performed by: INTERNAL MEDICINE

## 2025-01-13 PROCEDURE — 80053 COMPREHEN METABOLIC PANEL: CPT

## 2025-01-13 PROCEDURE — 99214 OFFICE O/P EST MOD 30 MIN: CPT | Performed by: INTERNAL MEDICINE

## 2025-01-13 PROCEDURE — G2211 COMPLEX E/M VISIT ADD ON: HCPCS | Performed by: INTERNAL MEDICINE

## 2025-01-13 PROCEDURE — 36415 COLL VENOUS BLD VENIPUNCTURE: CPT

## 2025-01-13 PROCEDURE — 85025 COMPLETE CBC W/AUTO DIFF WBC: CPT

## 2025-01-13 PROCEDURE — 86140 C-REACTIVE PROTEIN: CPT

## 2025-01-13 ASSESSMENT — PAIN SCALES - GENERAL: PAINLEVEL_OUTOF10: MODERATE PAIN (5)

## 2025-01-13 NOTE — LETTER
1/13/2025       RE: Gricel Agarwal  [address] [address]     Dear Colleague,    Thank you for referring your patient, Gricel Agarwal, to the [facility] RHEUMATOLOGY at [facility]. Please see a copy of my visit note below.                         Chief Complaint/Reason for Visit: psoriatic arthritis     HPI:    Gricel Agarwal presents is a 57 year old White female with past medical history listed below. She has low back pain, middle and left and right, with raditing down her right leg. She has b/l CTS - surgery coming up in summer. Denied having pain or swelling of other joints.     REVIEW OF SYSTEMS    General -negative for fever, fatigue  HEENT -negative for uveitis  Cardiovascular -negative for chest pain, palpitations and shortness of breath  Respiratory - pos for history of asthma, currently denies shortness of breath or cough  Gastrointestinal -negative for blood in stool  Genitourinary -negative for blood in urine  Skin -neg for rash  Neuro - history of seizure +  Hematologic - pos for easy bruising   Psych - pos for anxiety and depression, still on effexor   Smoking - never smoker, does not drink alcohol   No use of recreational drugs       No past medical history on file.  No past surgical history on file.  Family History   Problem Relation Age of Onset     Colon Cancer Father      Pancreatic Cancer Paternal Grandmother      Leukemia Paternal Grandfather      Social History     Socioeconomic History     Marital status:    Tobacco Use     Smoking status: Never     Smokeless tobacco: Never       No Known Allergies    Current Outpatient Medications   Medication Sig Dispense Refill     albuterol (VENTOLIN HFA) 108 (90 Base) MCG/ACT inhaler Inhale 2 puffs into the lungs every 6 hours as needed       apremilast (OTEZLA) 30 MG tablet Take 1 tablet (30 mg) by mouth 2 times daily. 60 tablet 6     calcipotriene (DOVONOX) 0.005 %  external ointment Apply topically 2 times daily with triamcinolone 120 g 11     fluticasone-salmeterol (ADVAIR DISKUS) 250-50 MCG/DOSE inhaler Inhale 1 puff into the lungs 2 times daily       hydrocortisone 2.5 % ointment Use daily to rash on face, ears, underarms, or groin 60 g 3     lamoTRIgine (LAMICTAL) 25 MG tablet PLEASE SEE ATTACHED FOR DETAILED DIRECTIONS       levETIRAcetam (KEPPRA) 500 MG tablet Take 500 mg by mouth 2 times daily       triamcinolone (KENALOG) 0.1 % external ointment Use at night to rash on body 454 g 3     venlafaxine (EFFEXOR-XR) 150 MG 24 hr capsule Take 300 mg by mouth daily       Cholecalciferol (VITAMIN D3) 1000 units CAPS  (Patient not taking: Reported on 1/13/2025)       IRON PO Take 1 tablet by mouth (Patient not taking: Reported on 1/13/2025)       No current facility-administered medications for this visit.       PHYSICAL EXAM    /70 (BP Location: Right arm, Patient Position: Sitting, Cuff Size: Adult Regular)   Pulse 70   Wt 80.3 kg (177 lb)   SpO2 100%   BMI 26.14 kg/m        General: Alert, No apparent distress Psych: Affect euthymic  Eyes: Sclera noninjected  Skin: normal  Cardiovascular: Regular rate and rhythm, Normal S1 and S2 and No murmurs, rubs or gallops  Respiratory: Clear to auscultation with no wheezing or crackles  Neuro: Normal gait and Able to arise from seated position unassisted  Musculoskeletal: Hands:  Normal.  Negative for synovitis.  She is able to make a full fist.  Wrists:  Normal.  Elbows:  Normal.  Shoulders: Normal   Feet:  Normal.  Ankles:  Normal.  Knees:  Normal.    LABS   Reviewed as below    July 2024  CBC normal  CMP - creatinine, AST, ALT normal  CRP normal    Nov 2023  QTB neg    Nov 2023  CRP, Vit D, TSH, treponema, G6PD,QTB normal/neg  CBC, CMP normal except for elevated - normal isoenzymes.      Latest Reference Range & Units 09/20/17 12:00   Hep B Surface Agn NR^Nonreactive  Nonreactive   Hepatitis B Core Lucia NR^Nonreactive   Nonreactive   Hepatitis C Antibody NR^Nonreactive  Nonreactive   HIV Antigen Antibody Combo NR^Nonreactive     Nonreactive       ASSESSMENT      1. Psoriatic arthritis (H)    2. Psoriasis    3. History of breast cancer    4. Generalized OA    5. Long-term use of high-risk medication      (L40.50) Psoriatic arthritis (H)  (primary encounter diagnosis)  Comment: She was diagnosed with psoriatic arthritis in 2019 and used to follow-up with Dr. Rivers.Enbrel did not help with scalp psoriasis, methotrexate 5 mg okay'd by oncology - stopped due to leukopenia.  She also has history of elevated liver tests in 2018 but she was not on methotrexate at that time.  Sulfasalazine was proposed but never started it as oncology recommended Otezla instead.  She stopped using Otezla after 1 year.  She developed a seizure in May 2023 and was told to stay away from injectables. Restarted otzela in May 2024. Prescription managed  by derm. No synovitis on exam.     Plan:   Continue otezla 30 mg BID   Orders Placed This Encounter   Procedures     Comprehensive metabolic panel     CRP inflammation     Physical Therapy  Referral     CBC with platelets differential       (L40.9) Psoriasis  Comment: on topicals and otezla  Plan: per Derm     (Z85.3) History of breast cancer  Comment: Diagnosed in 2015.  The patient underwent lumpectomy and was treated with anastrozole from April 2016 -2021.  She continues to follow-up with oncologist.  Plan: Per oncologist    (Z87.898) History of seizure  Comment: Patient follows up with neurology.  On Keppra 500 mg twice daily.  Plan: Continue to follow-up with neurology.    Generalized OA  Comment : OA of left shoulder and hands  Plan : may use tylenol  not to exceed 2 grams in 24 hours prn     Long term use of high-risk medication   Comment : Otezla  Plan : have discussed with potential adverse effects with use of biologics such as suppression of immune system making pt more prone to infections,  to hold the medication if the patient has any signs of any infection and to restart only after infection has cleared, reactivation of infections particularly TB and hepatitis B, C, fungal infections, risk of demyelinating disorders and to stop medication immediately if there's any weakness or paresthesias, risk of malignancy. Patient verbalized understanding and agrees to proceed.      RTC - 6 months.       REKHA SARAVIA MD    Division of Rheumatic & Autoimmune Diseases  [facility]       Again, thank you for allowing me to participate in the care of your patient.      Sincerely,    Rekha Saravia MD

## 2025-01-13 NOTE — PROGRESS NOTES
Chief Complaint/Reason for Visit: psoriatic arthritis     HPI:    Gricel Agarawl presents is a 57 year old White female with past medical history listed below. She has low back pain, middle and left and right, with raditing down her right leg. She has b/l CTS - surgery coming up in summer. Denied having pain or swelling of other joints.     REVIEW OF SYSTEMS    General -negative for fever, fatigue  HEENT -negative for uveitis  Cardiovascular -negative for chest pain, palpitations and shortness of breath  Respiratory - pos for history of asthma, currently denies shortness of breath or cough  Gastrointestinal -negative for blood in stool  Genitourinary -negative for blood in urine  Skin -neg for rash  Neuro - history of seizure +  Hematologic - pos for easy bruising   Psych - pos for anxiety and depression, still on effexor   Smoking - never smoker, does not drink alcohol   No use of recreational drugs       No past medical history on file.  No past surgical history on file.  Family History   Problem Relation Age of Onset    Colon Cancer Father     Pancreatic Cancer Paternal Grandmother     Leukemia Paternal Grandfather      Social History     Socioeconomic History    Marital status:    Tobacco Use    Smoking status: Never    Smokeless tobacco: Never       No Known Allergies    Current Outpatient Medications   Medication Sig Dispense Refill    albuterol (VENTOLIN HFA) 108 (90 Base) MCG/ACT inhaler Inhale 2 puffs into the lungs every 6 hours as needed      apremilast (OTEZLA) 30 MG tablet Take 1 tablet (30 mg) by mouth 2 times daily. 60 tablet 6    calcipotriene (DOVONOX) 0.005 % external ointment Apply topically 2 times daily with triamcinolone 120 g 11    fluticasone-salmeterol (ADVAIR DISKUS) 250-50 MCG/DOSE inhaler Inhale 1 puff into the lungs 2 times daily      hydrocortisone 2.5 % ointment Use daily to rash on face, ears, underarms, or groin 60 g 3    lamoTRIgine (LAMICTAL) 25 MG  tablet PLEASE SEE ATTACHED FOR DETAILED DIRECTIONS      levETIRAcetam (KEPPRA) 500 MG tablet Take 500 mg by mouth 2 times daily      triamcinolone (KENALOG) 0.1 % external ointment Use at night to rash on body 454 g 3    venlafaxine (EFFEXOR-XR) 150 MG 24 hr capsule Take 300 mg by mouth daily      Cholecalciferol (VITAMIN D3) 1000 units CAPS  (Patient not taking: Reported on 1/13/2025)      IRON PO Take 1 tablet by mouth (Patient not taking: Reported on 1/13/2025)       No current facility-administered medications for this visit.       PHYSICAL EXAM    /70 (BP Location: Right arm, Patient Position: Sitting, Cuff Size: Adult Regular)   Pulse 70   Wt 80.3 kg (177 lb)   SpO2 100%   BMI 26.14 kg/m        General: Alert, No apparent distress Psych: Affect euthymic  Eyes: Sclera noninjected  Skin: normal  Cardiovascular: Regular rate and rhythm, Normal S1 and S2 and No murmurs, rubs or gallops  Respiratory: Clear to auscultation with no wheezing or crackles  Neuro: Normal gait and Able to arise from seated position unassisted  Musculoskeletal: Hands:  Normal.  Negative for synovitis.  She is able to make a full fist.  Wrists:  Normal.  Elbows:  Normal.  Shoulders: Normal   Feet:  Normal.  Ankles:  Normal.  Knees:  Normal.    LABS   Reviewed as below    July 2024  CBC normal  CMP - creatinine, AST, ALT normal  CRP normal    Nov 2023  QTB neg    Nov 2023  CRP, Vit D, TSH, treponema, G6PD,QTB normal/neg  CBC, CMP normal except for elevated - normal isoenzymes.      Latest Reference Range & Units 09/20/17 12:00   Hep B Surface Agn NR^Nonreactive  Nonreactive   Hepatitis B Core Lucia NR^Nonreactive  Nonreactive   Hepatitis C Antibody NR^Nonreactive  Nonreactive   HIV Antigen Antibody Combo NR^Nonreactive     Nonreactive       ASSESSMENT      1. Psoriatic arthritis (H)    2. Psoriasis    3. History of breast cancer    4. Generalized OA    5. Long-term use of high-risk medication      (L40.50) Psoriatic arthritis  (H)  (primary encounter diagnosis)  Comment: She was diagnosed with psoriatic arthritis in 2019 and used to follow-up with Dr. Rivers.Enbrel did not help with scalp psoriasis, methotrexate 5 mg okay'd by oncology - stopped due to leukopenia.  She also has history of elevated liver tests in 2018 but she was not on methotrexate at that time.  Sulfasalazine was proposed but never started it as oncology recommended Otezla instead.  She stopped using Otezla after 1 year.  She developed a seizure in May 2023 and was told to stay away from injectables. Restarted otzela in May 2024. Prescription managed  by derm. No synovitis on exam.     Plan:   Continue otezla 30 mg BID   Orders Placed This Encounter   Procedures    Comprehensive metabolic panel    CRP inflammation    Physical Therapy  Referral    CBC with platelets differential       (L40.9) Psoriasis  Comment: on topicals and otezla  Plan: per Derm     (Z85.3) History of breast cancer  Comment: Diagnosed in 2015.  The patient underwent lumpectomy and was treated with anastrozole from April 2016 -2021.  She continues to follow-up with oncologist.  Plan: Per oncologist    (Z87.898) History of seizure  Comment: Patient follows up with neurology.  On Keppra 500 mg twice daily.  Plan: Continue to follow-up with neurology.    Generalized OA  Comment : OA of left shoulder and hands  Plan : may use tylenol  not to exceed 2 grams in 24 hours prn     Long term use of high-risk medication   Comment : Otezla  Plan : have discussed with potential adverse effects with use of biologics such as suppression of immune system making pt more prone to infections, to hold the medication if the patient has any signs of any infection and to restart only after infection has cleared, reactivation of infections particularly TB and hepatitis B, C, fungal infections, risk of demyelinating disorders and to stop medication immediately if there's any weakness or paresthesias, risk of  malignancy. Patient verbalized understanding and agrees to proceed.      RTC - 6 months.       JEISON SARAVIA MD    Division of Rheumatic & Autoimmune Diseases  [facility]

## 2025-01-14 LAB
ALBUMIN SERPL BCG-MCNC: 4.4 G/DL (ref 3.5–5.2)
ALP SERPL-CCNC: 98 U/L (ref 40–150)
ALT SERPL W P-5'-P-CCNC: 13 U/L (ref 0–50)
ANION GAP SERPL CALCULATED.3IONS-SCNC: 13 MMOL/L (ref 7–15)
AST SERPL W P-5'-P-CCNC: 17 U/L (ref 0–45)
BILIRUB SERPL-MCNC: 0.9 MG/DL
BUN SERPL-MCNC: 13.5 MG/DL (ref 8–23)
CALCIUM SERPL-MCNC: 9.1 MG/DL (ref 8.8–10.4)
CHLORIDE SERPL-SCNC: 103 MMOL/L (ref 98–107)
CREAT SERPL-MCNC: 0.79 MG/DL (ref 0.51–0.95)
CRP SERPL-MCNC: <3 MG/L
EGFRCR SERPLBLD CKD-EPI 2021: 86 ML/MIN/1.73M2
GLUCOSE SERPL-MCNC: 78 MG/DL (ref 70–99)
HCO3 SERPL-SCNC: 25 MMOL/L (ref 22–29)
POTASSIUM SERPL-SCNC: 3.8 MMOL/L (ref 3.4–5.3)
PROT SERPL-MCNC: 6.6 G/DL (ref 6.4–8.3)
SODIUM SERPL-SCNC: 141 MMOL/L (ref 135–145)

## 2025-05-06 DIAGNOSIS — L40.50 PSORIATIC ARTHRITIS (H): ICD-10-CM

## 2025-05-06 RX ORDER — APREMILAST 30 MG/1
30 TABLET, FILM COATED ORAL 2 TIMES DAILY
Qty: 60 TABLET | Refills: 0 | Status: SHIPPED | OUTPATIENT
Start: 2025-05-06

## 2025-05-06 RX ORDER — APREMILAST 30 MG/1
30 TABLET, FILM COATED ORAL 2 TIMES DAILY
Qty: 180 TABLET | OUTPATIENT
Start: 2025-05-06

## 2025-05-06 NOTE — PROGRESS NOTES
Received a refill request for patient's Otezla.  Last dermatology appointment 2/26/2024, next appointment on 5/27/2025.  Therefore 1 month refill sent and I will send further refills after next dermatology appointment.

## 2025-05-27 ENCOUNTER — OFFICE VISIT (OUTPATIENT)
Dept: DERMATOLOGY | Facility: CLINIC | Age: 60
End: 2025-05-27
Attending: DERMATOLOGY
Payer: COMMERCIAL

## 2025-05-27 DIAGNOSIS — L82.1 SK (SEBORRHEIC KERATOSIS): ICD-10-CM

## 2025-05-27 DIAGNOSIS — L40.9 PSORIASIS: Primary | ICD-10-CM

## 2025-05-27 RX ORDER — BUSPIRONE HYDROCHLORIDE 10 MG/1
TABLET ORAL
COMMUNITY

## 2025-05-27 RX ORDER — OXCARBAZEPINE 300 MG/1
TABLET, FILM COATED ORAL
COMMUNITY
Start: 2025-05-05

## 2025-05-27 ASSESSMENT — PAIN SCALES - GENERAL: PAINLEVEL_OUTOF10: NO PAIN (0)

## 2025-05-27 NOTE — NURSING NOTE
Dermatology Rooming Note    Gricel Agarwal's goals for this visit include:   Chief Complaint   Patient presents with    Psoriasis     Doing well on Otezla     Marielena Davis LPN

## 2025-05-27 NOTE — PROGRESS NOTES
[facility] Dermatology Note  Encounter Date: May 27, 2025  Office Visit     Dermatology Problem List:  1.Psoriasis and psoriatic arthritis.    --Current:                -Triamcinolone 0.1% ointment/calcipotriene ointment/ 2.5% hydrocortisone (refilled 10/31/23)              -Otezla per rheumatology stopped 05/2023 due to seizures (unsure whether or not seizures related to medication). Restarted  --Past: Enbrel used back in 2012, methotrexate test dose 2018 with leukopenia.   --Consider future adjunctive nbUVB  --H/o breast cancer       ____________________________________________    Assessment & Plan:     # Psoriasis and psoriatic arthritis- much improved  - Tolerating Otezla well (no seizure activity) and wishes to continue.  Will route to MTM team to continue medication  - Triam ointment, calcipotriene and hydrocortisone 2.5% as topicals will continue.  Mainly uses triam to elbows at bedtime    # Seborrheic keratoses- discussed benign nature       Follow-up: 1 year(s) in-person, or earlier for new or changing lesions    Staff:     Nicolle Raphael MD  ____________________________________________    CC: Psoriasis (Doing well on Otezla)    HPI:  Ms. Gricel Agarwal is a(n) 59 year old female who presents today as a return patient for psoriasis follow up.  Doing very well with minimal skin or joint involvement of the psoriasis.  No seizures.  Minimal use of topical medications.  Notes a few scaly spots on flank    Patient is otherwise feeling well, without additional skin concerns.     Labs Reviewed:  CMP 1/13/25 with normal Creatinine    Physical Exam:  Vitals: There were no vitals taken for this visit.  SKIN: Focused examination of arms, legs and back was performed.  - red scaly plaques of the elbows  - waxy stuck on papules of shoulder and flanks  - No other lesions of concern on areas examined.     Medications:  Current Outpatient Medications   Medication Sig Dispense Refill    albuterol  (VENTOLIN HFA) 108 (90 Base) MCG/ACT inhaler Inhale 2 puffs into the lungs every 6 hours as needed      apremilast (OTEZLA) 30 MG tablet Take 1 tablet (30 mg) by mouth 2 times daily. 60 tablet 0    busPIRone (BUSPAR) 10 MG tablet take 1 tablet (10 mg) by mouth twice a day as needed.      fluticasone-salmeterol (ADVAIR DISKUS) 250-50 MCG/DOSE inhaler Inhale 1 puff into the lungs 2 times daily      levETIRAcetam (KEPPRA) 500 MG tablet Take 500 mg by mouth 2 times daily      OXcarbazepine (TRILEPTAL) 300 MG tablet Start with 1/2 tab twice daily for 1 week, then increase to 1 tab twice daily, then 1.5 tabs twice daily for 1 week, then 2 tabs twice daily.      triamcinolone (KENALOG) 0.1 % external ointment Use at night to rash on body 454 g 3    venlafaxine (EFFEXOR-XR) 150 MG 24 hr capsule Take 300 mg by mouth daily      calcipotriene (DOVONOX) 0.005 % external ointment Apply topically 2 times daily with triamcinolone (Patient not taking: Reported on 5/27/2025) 120 g 11    Cholecalciferol (VITAMIN D3) 1000 units CAPS  (Patient not taking: Reported on 5/27/2025)      hydrocortisone 2.5 % ointment Use daily to rash on face, ears, underarms, or groin (Patient not taking: Reported on 5/27/2025) 60 g 3    IRON PO Take 1 tablet by mouth (Patient not taking: Reported on 5/27/2025)      lamoTRIgine (LAMICTAL) 25 MG tablet PLEASE SEE ATTACHED FOR DETAILED DIRECTIONS (Patient not taking: Reported on 5/27/2025)       No current facility-administered medications for this visit.      Past Medical History:   Patient Active Problem List   Diagnosis   (none) - all problems resolved or deleted     No past medical history on file.    CC Nicolle Raphael MD  [address]  [place] [address] on close of this encounter.

## 2025-05-27 NOTE — LETTER
5/27/2025       RE: Gricel Agarwal  [address] [address]     Dear Colleague,    Thank you for referring your patient, Gricel Agarwal, to the [facility] [place] [facility]. Please see a copy of my visit note below.    [facility] Dermatology Note  Encounter Date: May 27, 2025  Office Visit     Dermatology Problem List:  1.Psoriasis and psoriatic arthritis.    --Current:                -Triamcinolone 0.1% ointment/calcipotriene ointment/ 2.5% hydrocortisone (refilled 10/31/23)              -Otezla per rheumatology stopped 05/2023 due to seizures (unsure whether or not seizures related to medication). Restarted  --Past: Enbrel used back in 2012, methotrexate test dose 2018 with leukopenia.   --Consider future adjunctive nbUVB  --H/o breast cancer       ____________________________________________    Assessment & Plan:     # Psoriasis and psoriatic arthritis- much improved  - Tolerating Otezla well (no seizure activity) and wishes to continue.  Will route to MTM team to continue medication  - Triam ointment, calcipotriene and hydrocortisone 2.5% as topicals will continue.  Mainly uses triam to elbows at bedtime    # Seborrheic keratoses- discussed benign nature       Follow-up: 1 year(s) in-person, or earlier for new or changing lesions    Staff:     Nicolle Raphael MD  ____________________________________________    CC: Psoriasis (Doing well on Otezla)    HPI:  Ms. Gricel Agarwal is a(n) 59 year old female who presents today as a return patient for psoriasis follow up.  Doing very well with minimal skin or joint involvement of the psoriasis.  No seizures.  Minimal use of topical medications.  Notes a few scaly spots on flank    Patient is otherwise feeling well, without additional skin concerns.     Labs Reviewed:  CMP 1/13/25 with normal Creatinine    Physical Exam:  Vitals: There were no vitals  taken for this visit.  SKIN: Focused examination of arms, legs and back was performed.  - red scaly plaques of the elbows  - waxy stuck on papules of shoulder and flanks  - No other lesions of concern on areas examined.     Medications:  Current Outpatient Medications   Medication Sig Dispense Refill     albuterol (VENTOLIN HFA) 108 (90 Base) MCG/ACT inhaler Inhale 2 puffs into the lungs every 6 hours as needed       apremilast (OTEZLA) 30 MG tablet Take 1 tablet (30 mg) by mouth 2 times daily. 60 tablet 0     busPIRone (BUSPAR) 10 MG tablet take 1 tablet (10 mg) by mouth twice a day as needed.       fluticasone-salmeterol (ADVAIR DISKUS) 250-50 MCG/DOSE inhaler Inhale 1 puff into the lungs 2 times daily       levETIRAcetam (KEPPRA) 500 MG tablet Take 500 mg by mouth 2 times daily       OXcarbazepine (TRILEPTAL) 300 MG tablet Start with 1/2 tab twice daily for 1 week, then increase to 1 tab twice daily, then 1.5 tabs twice daily for 1 week, then 2 tabs twice daily.       triamcinolone (KENALOG) 0.1 % external ointment Use at night to rash on body 454 g 3     venlafaxine (EFFEXOR-XR) 150 MG 24 hr capsule Take 300 mg by mouth daily       calcipotriene (DOVONOX) 0.005 % external ointment Apply topically 2 times daily with triamcinolone (Patient not taking: Reported on 5/27/2025) 120 g 11     Cholecalciferol (VITAMIN D3) 1000 units CAPS  (Patient not taking: Reported on 5/27/2025)       hydrocortisone 2.5 % ointment Use daily to rash on face, ears, underarms, or groin (Patient not taking: Reported on 5/27/2025) 60 g 3     IRON PO Take 1 tablet by mouth (Patient not taking: Reported on 5/27/2025)       lamoTRIgine (LAMICTAL) 25 MG tablet PLEASE SEE ATTACHED FOR DETAILED DIRECTIONS (Patient not taking: Reported on 5/27/2025)       No current facility-administered medications for this visit.      Past Medical History:   Patient Active Problem List   Diagnosis   (none) - all problems resolved or deleted     No past medical  history on file.    CC Nicolle Raphael MD  [address]  [place] [address] on close of this encounter.      Again, thank you for allowing me to participate in the care of your patient.      Sincerely,    Nicolle Raphael MD

## 2025-06-03 DIAGNOSIS — L40.50 PSORIATIC ARTHRITIS (H): ICD-10-CM

## 2025-06-04 RX ORDER — APREMILAST 30 MG/1
30 TABLET, FILM COATED ORAL 2 TIMES DAILY
Qty: 180 TABLET | Refills: 1 | Status: SHIPPED | OUTPATIENT
Start: 2025-06-04

## 2025-06-04 NOTE — TELEPHONE ENCOUNTER
Called patient to follow up (MTM referral placed 5/27/25) -- no answer, LVM. I will try her another time, but for now will refill the Otezla for 6 months. Patient last saw rheum 1/13/25 and has a follow up 7/23/25 and recommended also to continue Otezla.

## 2025-06-05 NOTE — PROCEDURE: COUNSELING
Writer called and spoke with patient to discuss NICU consult. Offered 6/16, however her  is not available this day. Discussed will try to coordinate an off-schedule NICU consult and reach back out once a date is determined, patient SLAVA.   
Detail Level: Zone

## 2025-06-13 ENCOUNTER — TELEPHONE (OUTPATIENT)
Dept: RHEUMATOLOGY | Facility: CLINIC | Age: 60
End: 2025-06-13

## (undated) RX ORDER — LIDOCAINE HYDROCHLORIDE AND EPINEPHRINE 10; 10 MG/ML; UG/ML
INJECTION, SOLUTION INFILTRATION; PERINEURAL
Status: DISPENSED
Start: 2023-10-31